# Patient Record
Sex: MALE | Race: BLACK OR AFRICAN AMERICAN | NOT HISPANIC OR LATINO | URBAN - METROPOLITAN AREA
[De-identification: names, ages, dates, MRNs, and addresses within clinical notes are randomized per-mention and may not be internally consistent; named-entity substitution may affect disease eponyms.]

---

## 2017-08-09 ENCOUNTER — OUTPATIENT (OUTPATIENT)
Dept: OUTPATIENT SERVICES | Facility: HOSPITAL | Age: 82
LOS: 1 days | Discharge: HOME | End: 2017-08-09
Payer: MEDICARE

## 2017-08-09 VITALS
TEMPERATURE: 98 F | HEART RATE: 58 BPM | OXYGEN SATURATION: 100 % | DIASTOLIC BLOOD PRESSURE: 83 MMHG | RESPIRATION RATE: 12 BRPM | SYSTOLIC BLOOD PRESSURE: 134 MMHG

## 2017-08-09 DIAGNOSIS — I25.10 ATHEROSCLEROTIC HEART DISEASE OF NATIVE CORONARY ARTERY WITHOUT ANGINA PECTORIS: ICD-10-CM

## 2017-08-09 DIAGNOSIS — Z98.890 OTHER SPECIFIED POSTPROCEDURAL STATES: Chronic | ICD-10-CM

## 2017-08-09 LAB
BUN SERPL-MCNC: 27 MG/DL — HIGH (ref 7–23)
CALCIUM SERPL-MCNC: 9.4 MG/DL — SIGNIFICANT CHANGE UP (ref 8.4–10.5)
CHLORIDE SERPL-SCNC: 106 MMOL/L — SIGNIFICANT CHANGE UP (ref 98–107)
CO2 SERPL-SCNC: 25 MMOL/L — SIGNIFICANT CHANGE UP (ref 22–31)
CREAT SERPL-MCNC: 1.39 MG/DL — HIGH (ref 0.5–1.3)
GLUCOSE SERPL-MCNC: 96 MG/DL — SIGNIFICANT CHANGE UP (ref 70–99)
HBA1C BLD-MCNC: 5.8 % — HIGH (ref 4–5.6)
HCT VFR BLD CALC: 41.2 % — SIGNIFICANT CHANGE UP (ref 39–50)
HGB BLD-MCNC: 12.7 G/DL — LOW (ref 13–17)
MCHC RBC-ENTMCNC: 23 PG — LOW (ref 27–34)
MCHC RBC-ENTMCNC: 30.8 % — LOW (ref 32–36)
MCV RBC AUTO: 74.8 FL — LOW (ref 80–100)
NRBC # FLD: 0 — SIGNIFICANT CHANGE UP
PLATELET # BLD AUTO: 185 K/UL — SIGNIFICANT CHANGE UP (ref 150–400)
PMV BLD: 10.5 FL — SIGNIFICANT CHANGE UP (ref 7–13)
POTASSIUM SERPL-MCNC: 4.4 MMOL/L — SIGNIFICANT CHANGE UP (ref 3.5–5.3)
POTASSIUM SERPL-SCNC: 4.4 MMOL/L — SIGNIFICANT CHANGE UP (ref 3.5–5.3)
RBC # BLD: 5.51 M/UL — SIGNIFICANT CHANGE UP (ref 4.2–5.8)
RBC # FLD: 15.4 % — HIGH (ref 10.3–14.5)
SODIUM SERPL-SCNC: 142 MMOL/L — SIGNIFICANT CHANGE UP (ref 135–145)
WBC # BLD: 3.45 K/UL — LOW (ref 3.8–10.5)
WBC # FLD AUTO: 3.45 K/UL — LOW (ref 3.8–10.5)

## 2017-08-09 PROCEDURE — 93010 ELECTROCARDIOGRAM REPORT: CPT

## 2017-08-09 RX ORDER — DORZOLAMIDE HYDROCHLORIDE 20 MG/ML
1 SOLUTION/ DROPS OPHTHALMIC
Qty: 0 | Refills: 0 | COMMUNITY

## 2017-08-09 RX ORDER — BRIMONIDINE TARTRATE 2 MG/MG
1 SOLUTION/ DROPS OPHTHALMIC
Qty: 0 | Refills: 0 | COMMUNITY

## 2017-08-09 RX ORDER — SODIUM CHLORIDE 9 MG/ML
3 INJECTION INTRAMUSCULAR; INTRAVENOUS; SUBCUTANEOUS EVERY 8 HOURS
Qty: 0 | Refills: 0 | Status: DISCONTINUED | OUTPATIENT
Start: 2017-08-09 | End: 2017-08-24

## 2017-08-09 RX ORDER — ATORVASTATIN CALCIUM 80 MG/1
1 TABLET, FILM COATED ORAL
Qty: 30 | Refills: 0 | OUTPATIENT
Start: 2017-08-09 | End: 2017-09-08

## 2017-08-09 RX ORDER — SODIUM CHLORIDE 9 MG/ML
500 INJECTION INTRAMUSCULAR; INTRAVENOUS; SUBCUTANEOUS
Qty: 0 | Refills: 0 | Status: DISCONTINUED | OUTPATIENT
Start: 2017-08-09 | End: 2017-08-24

## 2017-08-09 NOTE — CHART NOTE - NSCHARTNOTEFT_GEN_A_CORE
The patient is s/p cardiac cath, showed RCA 20%, recommended by Dr. Jackman to start Plavix 75 mg daily and Lipitor 20 mg at bedtime. However, the patient refuses to start Plavix  and would like to discuss it with his primary cardiologist. Also, as per Dr. Jackman, patient can restart Eliqius in am 8/10/17.   Unable to use e-prescribe (sunrise is down), patient was made aware, he will get the prescription from his cardiologist.

## 2017-08-09 NOTE — H&P CARDIOLOGY - PMH
CAD in native artery    Cardiac pacemaker  2010 [employer]  Chronic kidney disease, unspecified CKD stage    Glaucoma    HTN (Hypertension)    Mobitz type 1 second degree AV block  s/p PPM implant  PAF (paroxysmal atrial fibrillation)  on Eliquis  Prostate Cancer  10 years ago - treated with prostatectomy  Rotator Cuff Injury    SSS (sick sinus syndrome)  s/p PPM implant  Stented coronary artery    Stented coronary artery CAD in native artery    Cardiac pacemaker  2010 [employer]  Chronic kidney disease, unspecified CKD stage    Glaucoma    HTN (Hypertension)    Mobitz type 1 second degree AV block  s/p PPM implant  PAF (paroxysmal atrial fibrillation)  on Eliquis  Prostate Cancer  10 years ago - treated with prostatectomy  Rotator Cuff Injury    SSS (sick sinus syndrome)  s/p PPM implant  Stented coronary artery  RCA stent in 2012

## 2017-08-09 NOTE — H&P CARDIOLOGY - REVIEW OF SYSTEMS
NO chest pain,  palpitations, diaphoresis, lightheadedness, dizziness, syncope, increased lower extremity edema, fever chills, malaise, myalgias, anorexia, weight changes ( loss or gain), night sweats, generalized fatigue abdominal pain, N/V/C/D BRBPR, melena, urinary symptoms, cough, and wheezing.

## 2017-08-09 NOTE — CHART NOTE - NSCHARTNOTEFT_GEN_A_CORE
The patient was noted to have elevated HgbA1c. The patient was made aware. Prediabetes pamphlet is given. The patient was recommended to f/u with PMD for further treatment. The patient was noted to have elevated HgbA1c. The patient was made aware over the phone. The patient was recommended to f/u with PMD for further treatment.

## 2017-08-09 NOTE — H&P CARDIOLOGY - HISTORY OF PRESENT ILLNESS
84 y.o. male presents today for elective cardiac catheterization. The patient c/o SOB with exertion (climbing stairs) started 1 year, getting progressively worse. The patient claims that could walk and swim miles, however lately he feels SOB with walking 2 blocks, resolve with rest. Denies chest pain, palpitations, dizziness, presyncope, syncope, fever, rash, b/l lower extremities edema. The patient was evaluated by his cardiologist, had Echo. Last Stress test in 2013. Due to patient's symptoms and abnormal non invasive tests, the patient was recommended to have cardiac cath. The patient denies any complaints at present.     Echo 4/5/17 -EF 55%, Normal LV function and size. Mild diastolic dysfunction. Device wire in R heart. Mild LAE.   Stress test 12/18/13 - EF 59%. Normal myocardial perfusion SPECT images. 84 y.o. male presents today for elective cardiac catheterization. The patient c/o SOB with exertion (climbing stairs) started 1 year ago, getting progressively worse. The patient claims that could walk and swim a few miles, however lately he feels SOB with walking 2 blocks, resolve with rest. Denies chest pain, palpitations, dizziness, presyncope, syncope, fever, rash, b/l lower extremities edema. The patient was evaluated by his cardiologist, had Echo. Last Stress test in 2013. Due to patient's symptoms and abnormal non invasive tests, the patient was recommended to have cardiac cath. The patient denies any complaints at present.     Echo 4/5/17 -EF 55%, Normal LV function and size. Mild diastolic dysfunction. Device wire in R heart. Mild LAE.   Stress test 12/18/13 - EF 59%. Normal myocardial perfusion SPECT images.

## 2017-08-09 NOTE — H&P CARDIOLOGY - PSH
Cardiac pacemaker  11/4/10 for symptomatic bradycardia  H/O eye surgery    S/P Prostatectomy  10 years ago  S/P Rotator Cuff Surgery    S/P T&A    S/P TKR (Total Knee Replacement)  b/l

## 2017-08-24 ENCOUNTER — APPOINTMENT (OUTPATIENT)
Dept: NUCLEAR MEDICINE | Facility: IMAGING CENTER | Age: 82
End: 2017-08-24
Payer: MEDICARE

## 2017-08-24 ENCOUNTER — OUTPATIENT (OUTPATIENT)
Dept: OUTPATIENT SERVICES | Facility: HOSPITAL | Age: 82
LOS: 1 days | End: 2017-08-24
Payer: COMMERCIAL

## 2017-08-24 DIAGNOSIS — R74.8 ABNORMAL LEVELS OF OTHER SERUM ENZYMES: ICD-10-CM

## 2017-08-24 DIAGNOSIS — Z98.890 OTHER SPECIFIED POSTPROCEDURAL STATES: Chronic | ICD-10-CM

## 2017-08-24 PROCEDURE — 78306 BONE IMAGING WHOLE BODY: CPT

## 2017-08-24 PROCEDURE — 78320: CPT | Mod: 26

## 2017-08-24 PROCEDURE — 78306 BONE IMAGING WHOLE BODY: CPT | Mod: 26

## 2017-08-24 PROCEDURE — 78999 UNLISTED MISC PX DX NUC MED: CPT

## 2017-08-24 PROCEDURE — A9561: CPT

## 2017-09-12 ENCOUNTER — OUTPATIENT (OUTPATIENT)
Dept: OUTPATIENT SERVICES | Facility: HOSPITAL | Age: 82
LOS: 1 days | End: 2017-09-12
Payer: COMMERCIAL

## 2017-09-12 ENCOUNTER — APPOINTMENT (OUTPATIENT)
Dept: CT IMAGING | Facility: IMAGING CENTER | Age: 82
End: 2017-09-12
Payer: COMMERCIAL

## 2017-09-12 DIAGNOSIS — C79.51 SECONDARY MALIGNANT NEOPLASM OF BONE: ICD-10-CM

## 2017-09-12 DIAGNOSIS — C61 MALIGNANT NEOPLASM OF PROSTATE: ICD-10-CM

## 2017-09-12 DIAGNOSIS — Z98.890 OTHER SPECIFIED POSTPROCEDURAL STATES: Chronic | ICD-10-CM

## 2017-09-12 PROCEDURE — 76376 3D RENDER W/INTRP POSTPROCES: CPT | Mod: 26

## 2017-09-12 PROCEDURE — 76376 3D RENDER W/INTRP POSTPROCES: CPT

## 2017-09-12 PROCEDURE — 73700 CT LOWER EXTREMITY W/O DYE: CPT | Mod: 26,50

## 2017-09-12 PROCEDURE — 73700 CT LOWER EXTREMITY W/O DYE: CPT

## 2017-10-16 ENCOUNTER — APPOINTMENT (OUTPATIENT)
Dept: ORTHOPEDIC SURGERY | Facility: CLINIC | Age: 82
End: 2017-10-16
Payer: COMMERCIAL

## 2017-10-16 VITALS — WEIGHT: 195 LBS | BODY MASS INDEX: 25.03 KG/M2 | HEIGHT: 74 IN

## 2017-10-16 VITALS — DIASTOLIC BLOOD PRESSURE: 79 MMHG | SYSTOLIC BLOOD PRESSURE: 165 MMHG | HEART RATE: 65 BPM

## 2017-10-16 DIAGNOSIS — Z78.9 OTHER SPECIFIED HEALTH STATUS: ICD-10-CM

## 2017-10-16 DIAGNOSIS — Z85.46 PERSONAL HISTORY OF MALIGNANT NEOPLASM OF PROSTATE: ICD-10-CM

## 2017-10-16 DIAGNOSIS — Z56.0 UNEMPLOYMENT, UNSPECIFIED: ICD-10-CM

## 2017-10-16 DIAGNOSIS — C79.51 MALIGNANT NEOPLASM OF PROSTATE: ICD-10-CM

## 2017-10-16 DIAGNOSIS — C61 MALIGNANT NEOPLASM OF PROSTATE: ICD-10-CM

## 2017-10-16 DIAGNOSIS — Z86.79 PERSONAL HISTORY OF OTHER DISEASES OF THE CIRCULATORY SYSTEM: ICD-10-CM

## 2017-10-16 PROCEDURE — 72170 X-RAY EXAM OF PELVIS: CPT

## 2017-10-16 PROCEDURE — 99204 OFFICE O/P NEW MOD 45 MIN: CPT

## 2017-10-16 SDOH — ECONOMIC STABILITY - INCOME SECURITY: UNEMPLOYMENT, UNSPECIFIED: Z56.0

## 2017-11-14 PROBLEM — Z78.9 DOES NOT USE ILLICIT DRUGS: Status: ACTIVE | Noted: 2017-10-16

## 2017-11-14 PROBLEM — Z85.46 HISTORY OF MALIGNANT NEOPLASM OF PROSTATE: Status: RESOLVED | Noted: 2017-10-16 | Resolved: 2017-11-14

## 2017-11-14 PROBLEM — Z86.79 HISTORY OF HYPERTENSION: Status: RESOLVED | Noted: 2017-10-16 | Resolved: 2017-11-14

## 2017-11-14 PROBLEM — Z78.9 RARELY CONSUMES ALCOHOL: Status: ACTIVE | Noted: 2017-10-16

## 2017-11-14 PROBLEM — Z56.0 UNEMPLOYED: Status: ACTIVE | Noted: 2017-10-16

## 2017-11-14 PROBLEM — Z78.9 CURRENT NON-SMOKER: Status: ACTIVE | Noted: 2017-10-16

## 2017-11-14 PROBLEM — Z78.9 EXERCISES OCCASIONALLY: Status: ACTIVE | Noted: 2017-10-16

## 2017-11-14 RX ORDER — LOSARTAN POTASSIUM 100 MG/1
TABLET, FILM COATED ORAL
Refills: 0 | Status: ACTIVE | COMMUNITY

## 2017-11-14 RX ORDER — METOPROLOL SUCCINATE 200 MG/1
TABLET, EXTENDED RELEASE ORAL
Refills: 0 | Status: ACTIVE | COMMUNITY

## 2018-04-02 ENCOUNTER — APPOINTMENT (OUTPATIENT)
Dept: CT IMAGING | Facility: IMAGING CENTER | Age: 83
End: 2018-04-02
Payer: MEDICARE

## 2018-04-02 ENCOUNTER — OUTPATIENT (OUTPATIENT)
Dept: OUTPATIENT SERVICES | Facility: HOSPITAL | Age: 83
LOS: 1 days | End: 2018-04-02
Payer: COMMERCIAL

## 2018-04-02 DIAGNOSIS — Z00.8 ENCOUNTER FOR OTHER GENERAL EXAMINATION: ICD-10-CM

## 2018-04-02 DIAGNOSIS — Z98.890 OTHER SPECIFIED POSTPROCEDURAL STATES: Chronic | ICD-10-CM

## 2018-04-02 PROCEDURE — 74177 CT ABD & PELVIS W/CONTRAST: CPT

## 2018-04-02 PROCEDURE — 74177 CT ABD & PELVIS W/CONTRAST: CPT | Mod: 26

## 2018-07-16 PROBLEM — Z95.5 PRESENCE OF CORONARY ANGIOPLASTY IMPLANT AND GRAFT: Chronic | Status: ACTIVE | Noted: 2017-08-09

## 2018-07-16 PROBLEM — Z95.0 PRESENCE OF CARDIAC PACEMAKER: Chronic | Status: ACTIVE | Noted: 2017-08-09

## 2019-01-18 PROBLEM — I49.5 SICK SINUS SYNDROME: Chronic | Status: ACTIVE | Noted: 2017-08-09

## 2019-01-18 PROBLEM — I48.0 PAROXYSMAL ATRIAL FIBRILLATION: Chronic | Status: ACTIVE | Noted: 2017-08-09

## 2019-01-18 PROBLEM — N18.9 CHRONIC KIDNEY DISEASE, UNSPECIFIED: Chronic | Status: ACTIVE | Noted: 2017-08-09

## 2019-01-18 PROBLEM — I25.10 ATHEROSCLEROTIC HEART DISEASE OF NATIVE CORONARY ARTERY WITHOUT ANGINA PECTORIS: Chronic | Status: ACTIVE | Noted: 2017-08-09

## 2019-01-18 PROBLEM — I44.1 ATRIOVENTRICULAR BLOCK, SECOND DEGREE: Chronic | Status: ACTIVE | Noted: 2017-08-09

## 2019-02-08 ENCOUNTER — OUTPATIENT (OUTPATIENT)
Dept: OUTPATIENT SERVICES | Facility: HOSPITAL | Age: 84
LOS: 1 days | End: 2019-02-08

## 2019-02-08 ENCOUNTER — OUTPATIENT (OUTPATIENT)
Dept: OUTPATIENT SERVICES | Facility: HOSPITAL | Age: 84
LOS: 1 days | End: 2019-02-08
Payer: COMMERCIAL

## 2019-02-08 ENCOUNTER — APPOINTMENT (OUTPATIENT)
Dept: CV DIAGNOSITCS | Facility: HOSPITAL | Age: 84
End: 2019-02-08

## 2019-02-08 VITALS
HEART RATE: 62 BPM | OXYGEN SATURATION: 98 % | TEMPERATURE: 98 F | RESPIRATION RATE: 16 BRPM | SYSTOLIC BLOOD PRESSURE: 156 MMHG | DIASTOLIC BLOOD PRESSURE: 87 MMHG

## 2019-02-08 DIAGNOSIS — Z98.890 OTHER SPECIFIED POSTPROCEDURAL STATES: Chronic | ICD-10-CM

## 2019-02-08 DIAGNOSIS — I48.91 UNSPECIFIED ATRIAL FIBRILLATION: ICD-10-CM

## 2019-02-08 DIAGNOSIS — Z01.818 ENCOUNTER FOR OTHER PREPROCEDURAL EXAMINATION: ICD-10-CM

## 2019-02-08 LAB
ANION GAP SERPL CALC-SCNC: 12 MMOL/L — SIGNIFICANT CHANGE UP (ref 5–17)
APTT BLD: 28.7 SEC — SIGNIFICANT CHANGE UP (ref 27.5–36.3)
BLD GP AB SCN SERPL QL: NEGATIVE — SIGNIFICANT CHANGE UP
BUN SERPL-MCNC: 20 MG/DL — SIGNIFICANT CHANGE UP (ref 7–23)
CALCIUM SERPL-MCNC: 8.8 MG/DL — SIGNIFICANT CHANGE UP (ref 8.4–10.5)
CHLORIDE SERPL-SCNC: 109 MMOL/L — HIGH (ref 96–108)
CO2 SERPL-SCNC: 22 MMOL/L — SIGNIFICANT CHANGE UP (ref 22–31)
CREAT SERPL-MCNC: 1.43 MG/DL — HIGH (ref 0.5–1.3)
GLUCOSE SERPL-MCNC: 103 MG/DL — HIGH (ref 70–99)
HCT VFR BLD CALC: 39 % — SIGNIFICANT CHANGE UP (ref 39–50)
HGB BLD-MCNC: 12.7 G/DL — LOW (ref 13–17)
INR BLD: 1.09 RATIO — SIGNIFICANT CHANGE UP (ref 0.88–1.16)
MCHC RBC-ENTMCNC: 25.9 PG — LOW (ref 27–34)
MCHC RBC-ENTMCNC: 32.6 GM/DL — SIGNIFICANT CHANGE UP (ref 32–36)
MCV RBC AUTO: 79.6 FL — LOW (ref 80–100)
PLATELET # BLD AUTO: 162 K/UL — SIGNIFICANT CHANGE UP (ref 150–400)
POTASSIUM SERPL-MCNC: 4.8 MMOL/L — SIGNIFICANT CHANGE UP (ref 3.5–5.3)
POTASSIUM SERPL-SCNC: 4.8 MMOL/L — SIGNIFICANT CHANGE UP (ref 3.5–5.3)
PROTHROM AB SERPL-ACNC: 12.6 SEC — SIGNIFICANT CHANGE UP (ref 10–12.9)
RBC # BLD: 4.9 M/UL — SIGNIFICANT CHANGE UP (ref 4.2–5.8)
RBC # FLD: 14 % — SIGNIFICANT CHANGE UP (ref 10.3–14.5)
RH IG SCN BLD-IMP: POSITIVE — SIGNIFICANT CHANGE UP
SODIUM SERPL-SCNC: 143 MMOL/L — SIGNIFICANT CHANGE UP (ref 135–145)
WBC # BLD: 4.8 K/UL — SIGNIFICANT CHANGE UP (ref 3.8–10.5)
WBC # FLD AUTO: 4.8 K/UL — SIGNIFICANT CHANGE UP (ref 3.8–10.5)

## 2019-02-08 PROCEDURE — 85730 THROMBOPLASTIN TIME PARTIAL: CPT

## 2019-02-08 PROCEDURE — 85027 COMPLETE CBC AUTOMATED: CPT

## 2019-02-08 PROCEDURE — 85610 PROTHROMBIN TIME: CPT

## 2019-02-08 PROCEDURE — 80048 BASIC METABOLIC PNL TOTAL CA: CPT

## 2019-02-08 PROCEDURE — 86901 BLOOD TYPING SEROLOGIC RH(D): CPT

## 2019-02-08 PROCEDURE — 86850 RBC ANTIBODY SCREEN: CPT

## 2019-02-08 PROCEDURE — 86900 BLOOD TYPING SEROLOGIC ABO: CPT

## 2019-02-08 PROCEDURE — 93010 ELECTROCARDIOGRAM REPORT: CPT

## 2019-02-08 PROCEDURE — 93005 ELECTROCARDIOGRAM TRACING: CPT

## 2019-02-08 RX ORDER — BIMATOPROST 0.3 MG/ML
1 SOLUTION/ DROPS OPHTHALMIC
Qty: 0 | Refills: 0 | COMMUNITY

## 2019-02-08 NOTE — H&P CARDIOLOGY - HISTORY OF PRESENT ILLNESS
86 y.o. male HTN, HLD, SSS, AV block s/p PPM, CAD s/p stent, CKD, glaucoma, prostate CA, Afib on Eliquis who presents today for PST for Afib ablation scheduled on 2/11.  Pt reports fatigued and short of breath that limits his daily activities, denies dizziness, diaphoresis, palpitations, nausea, vomiting, recent weight gain, or syncope.       Echo 4/5/17 -EF 55%, Normal LV function and size. Mild diastolic dysfunction. Device wire in R heart. Mild LAE.   Stress test 12/18/13 - EF 59%. Normal myocardial perfusion SPECT images.

## 2019-02-08 NOTE — H&P CARDIOLOGY - PMH
CAD in native artery    Cardiac pacemaker  2010 [employer]  Chronic kidney disease, unspecified CKD stage    Glaucoma    HTN (Hypertension)    Mobitz type 1 second degree AV block  s/p PPM implant  PAF (paroxysmal atrial fibrillation)  on Eliquis  Prostate Cancer  10 years ago - treated with prostatectomy  Rotator Cuff Injury    SSS (sick sinus syndrome)  s/p PPM implant  Stented coronary artery  RCA stent in 2012

## 2019-02-11 ENCOUNTER — APPOINTMENT (OUTPATIENT)
Dept: CV DIAGNOSITCS | Facility: HOSPITAL | Age: 84
End: 2019-02-11

## 2019-02-11 ENCOUNTER — INPATIENT (INPATIENT)
Facility: HOSPITAL | Age: 84
LOS: 0 days | Discharge: HOME | DRG: 274 | End: 2019-02-12
Attending: INTERNAL MEDICINE | Admitting: INTERNAL MEDICINE
Payer: COMMERCIAL

## 2019-02-11 ENCOUNTER — TRANSCRIPTION ENCOUNTER (OUTPATIENT)
Age: 84
End: 2019-02-11

## 2019-02-11 VITALS — HEIGHT: 74 IN | WEIGHT: 192.24 LBS

## 2019-02-11 DIAGNOSIS — I48.91 UNSPECIFIED ATRIAL FIBRILLATION: ICD-10-CM

## 2019-02-11 DIAGNOSIS — Z98.890 OTHER SPECIFIED POSTPROCEDURAL STATES: Chronic | ICD-10-CM

## 2019-02-11 LAB — RH IG SCN BLD-IMP: POSITIVE — SIGNIFICANT CHANGE UP

## 2019-02-11 PROCEDURE — 93010 ELECTROCARDIOGRAM REPORT: CPT

## 2019-02-11 PROCEDURE — 99223 1ST HOSP IP/OBS HIGH 75: CPT | Mod: GC

## 2019-02-11 PROCEDURE — 93325 DOPPLER ECHO COLOR FLOW MAPG: CPT | Mod: 26

## 2019-02-11 PROCEDURE — 93312 ECHO TRANSESOPHAGEAL: CPT | Mod: 26

## 2019-02-11 PROCEDURE — 93320 DOPPLER ECHO COMPLETE: CPT | Mod: 26

## 2019-02-11 RX ORDER — FUROSEMIDE 40 MG
20 TABLET ORAL DAILY
Qty: 0 | Refills: 0 | Status: DISCONTINUED | OUTPATIENT
Start: 2019-02-11 | End: 2019-02-11

## 2019-02-11 RX ORDER — METOPROLOL TARTRATE 50 MG
50 TABLET ORAL DAILY
Qty: 0 | Refills: 0 | Status: DISCONTINUED | OUTPATIENT
Start: 2019-02-11 | End: 2019-02-12

## 2019-02-11 RX ORDER — PANTOPRAZOLE SODIUM 20 MG/1
40 TABLET, DELAYED RELEASE ORAL
Qty: 0 | Refills: 0 | Status: DISCONTINUED | OUTPATIENT
Start: 2019-02-11 | End: 2019-02-12

## 2019-02-11 RX ORDER — APIXABAN 2.5 MG/1
2.5 TABLET, FILM COATED ORAL EVERY 12 HOURS
Qty: 0 | Refills: 0 | Status: DISCONTINUED | OUTPATIENT
Start: 2019-02-11 | End: 2019-02-12

## 2019-02-11 RX ADMIN — APIXABAN 2.5 MILLIGRAM(S): 2.5 TABLET, FILM COATED ORAL at 20:44

## 2019-02-11 NOTE — PRE-ANESTHESIA EVALUATION ADULT - NSANTHOSAYNRD_GEN_A_CORE
No. BALTAZAR screening performed.  STOP BANG Legend: 0-2 = LOW Risk; 3-4 = INTERMEDIATE Risk; 5-8 = HIGH Risk

## 2019-02-11 NOTE — CHART NOTE - NSCHARTNOTEFT_GEN_A_CORE
====================  CCU MIDNIGHT ROUNDS  ====================    LORI ALVAREZ  [id number]    ====================  SUMMARY: HPI:    ====================        ====================  NEW EVENTS:  s/p AFib ablation- bilateral groin sutures in place- no complaints.   ====================        ====================  VITALS (Last 12 hrs):  ====================    T(C): 36 (02-11-19 @ 14:20), Max: 36 (02-11-19 @ 14:20)  HR: 60 (02-11-19 @ 19:30) (60 - 62)  BP: 117/76 (02-11-19 @ 19:30) (117/76 - 157/89)  BP(mean): 92 (02-11-19 @ 19:30) (85 - 105)  RR: 18 (02-11-19 @ 19:30) (14 - 18)  SpO2: 99% (02-11-19 @ 19:30) (98% - 100%)          I&O's Summary    11 Feb 2019 07:01  -  11 Feb 2019 21:14  --------------------------------------------------------  IN: 0 mL / OUT: 200 mL / NET: -200 mL        ====================  PLAN:  # afib  - s/p ablation; in NSR  - bilateral groin sutures to be removed tonight; monitor sites  - continue home meds, eliquis tonight, soft diet, PPI  - anticipated discharge home tomorrow if stable   - monitor lytes, I/Os  ====================    HEALTH ISSUES - PROBLEM Dx:        WADE Chen #[id number]

## 2019-02-11 NOTE — PROGRESS NOTE ADULT - SUBJECTIVE AND OBJECTIVE BOX
EP Brief Operative Note    Diagnosis: persistent AF, typical AFL  Procedure: Afib and AFL ablations  Surgeon: Armando Fishman M.D.  Findings: none  EBL: minimal  Specimens: none  Post-op Diagnosis: NSR  Assistants: none      A/P) s/p uncomplicated atrial fibrillation (PVI) and atrial flutter (CTI) ablations, no acute complications    -restart all home meds including eliquis 2.5 bid tonight at 6pm  -start lasix 20mg po daily  -no need for AAD nor carafate  -expect d/c home in AM after limited portable echo r/o effusion  -f/u with me on Thu March 7th at 10:20am  -f/u with Lyandres on Tuesday April 30th at 11:30am      Armando Fishman M.D., [facility]  Cardiac Electrophysiology  [facility]  [address] [place] [address]  [email or web address]    office [phone]  pager [phone]

## 2019-02-12 VITALS — DIASTOLIC BLOOD PRESSURE: 66 MMHG | HEART RATE: 61 BPM | SYSTOLIC BLOOD PRESSURE: 127 MMHG

## 2019-02-12 DIAGNOSIS — I48.0 PAROXYSMAL ATRIAL FIBRILLATION: ICD-10-CM

## 2019-02-12 LAB
ANION GAP SERPL CALC-SCNC: 15 MMOL/L — SIGNIFICANT CHANGE UP (ref 5–17)
BUN SERPL-MCNC: 20 MG/DL — SIGNIFICANT CHANGE UP (ref 7–23)
CALCIUM SERPL-MCNC: 7.2 MG/DL — LOW (ref 8.4–10.5)
CHLORIDE SERPL-SCNC: 110 MMOL/L — HIGH (ref 96–108)
CO2 SERPL-SCNC: 18 MMOL/L — LOW (ref 22–31)
CREAT SERPL-MCNC: 1.41 MG/DL — HIGH (ref 0.5–1.3)
GLUCOSE SERPL-MCNC: 116 MG/DL — HIGH (ref 70–99)
HCT VFR BLD CALC: 32.1 % — LOW (ref 39–50)
HCT VFR BLD CALC: 34.4 % — LOW (ref 39–50)
HGB BLD-MCNC: 10.4 G/DL — LOW (ref 13–17)
HGB BLD-MCNC: 10.9 G/DL — LOW (ref 13–17)
MAGNESIUM SERPL-MCNC: 1.4 MG/DL — LOW (ref 1.6–2.6)
MCHC RBC-ENTMCNC: 25.1 PG — LOW (ref 27–34)
MCHC RBC-ENTMCNC: 25.3 PG — LOW (ref 27–34)
MCHC RBC-ENTMCNC: 31.8 GM/DL — LOW (ref 32–36)
MCHC RBC-ENTMCNC: 32.2 GM/DL — SIGNIFICANT CHANGE UP (ref 32–36)
MCV RBC AUTO: 78.6 FL — LOW (ref 80–100)
MCV RBC AUTO: 78.9 FL — LOW (ref 80–100)
PLATELET # BLD AUTO: 135 K/UL — LOW (ref 150–400)
PLATELET # BLD AUTO: 135 K/UL — LOW (ref 150–400)
POTASSIUM SERPL-MCNC: 4.3 MMOL/L — SIGNIFICANT CHANGE UP (ref 3.5–5.3)
POTASSIUM SERPL-SCNC: 4.3 MMOL/L — SIGNIFICANT CHANGE UP (ref 3.5–5.3)
RBC # BLD: 4.09 M/UL — LOW (ref 4.2–5.8)
RBC # BLD: 4.36 M/UL — SIGNIFICANT CHANGE UP (ref 4.2–5.8)
RBC # FLD: 13.4 % — SIGNIFICANT CHANGE UP (ref 10.3–14.5)
RBC # FLD: 13.8 % — SIGNIFICANT CHANGE UP (ref 10.3–14.5)
SODIUM SERPL-SCNC: 143 MMOL/L — SIGNIFICANT CHANGE UP (ref 135–145)
WBC # BLD: 5.4 K/UL — SIGNIFICANT CHANGE UP (ref 3.8–10.5)
WBC # BLD: 6.7 K/UL — SIGNIFICANT CHANGE UP (ref 3.8–10.5)
WBC # FLD AUTO: 5.4 K/UL — SIGNIFICANT CHANGE UP (ref 3.8–10.5)
WBC # FLD AUTO: 6.7 K/UL — SIGNIFICANT CHANGE UP (ref 3.8–10.5)

## 2019-02-12 PROCEDURE — 80048 BASIC METABOLIC PNL TOTAL CA: CPT

## 2019-02-12 PROCEDURE — C1732: CPT

## 2019-02-12 PROCEDURE — 93321 DOPPLER ECHO F-UP/LMTD STD: CPT

## 2019-02-12 PROCEDURE — 93321 DOPPLER ECHO F-UP/LMTD STD: CPT | Mod: 26

## 2019-02-12 PROCEDURE — 86901 BLOOD TYPING SEROLOGIC RH(D): CPT

## 2019-02-12 PROCEDURE — 93308 TTE F-UP OR LMTD: CPT

## 2019-02-12 PROCEDURE — C1894: CPT

## 2019-02-12 PROCEDURE — 99233 SBSQ HOSP IP/OBS HIGH 50: CPT

## 2019-02-12 PROCEDURE — 93662 INTRACARDIAC ECG (ICE): CPT

## 2019-02-12 PROCEDURE — 93325 DOPPLER ECHO COLOR FLOW MAPG: CPT

## 2019-02-12 PROCEDURE — 93010 ELECTROCARDIOGRAM REPORT: CPT

## 2019-02-12 PROCEDURE — C1766: CPT

## 2019-02-12 PROCEDURE — 83735 ASSAY OF MAGNESIUM: CPT

## 2019-02-12 PROCEDURE — 93656 COMPRE EP EVAL ABLTJ ATR FIB: CPT

## 2019-02-12 PROCEDURE — 86850 RBC ANTIBODY SCREEN: CPT

## 2019-02-12 PROCEDURE — 93655 ICAR CATH ABLTJ DSCRT ARRHYT: CPT

## 2019-02-12 PROCEDURE — 93623 PRGRMD STIMJ&PACG IV RX NFS: CPT

## 2019-02-12 PROCEDURE — 85027 COMPLETE CBC AUTOMATED: CPT

## 2019-02-12 PROCEDURE — 93312 ECHO TRANSESOPHAGEAL: CPT

## 2019-02-12 PROCEDURE — 93308 TTE F-UP OR LMTD: CPT | Mod: 26

## 2019-02-12 PROCEDURE — 93320 DOPPLER ECHO COMPLETE: CPT

## 2019-02-12 PROCEDURE — 93005 ELECTROCARDIOGRAM TRACING: CPT

## 2019-02-12 PROCEDURE — 93613 INTRACARDIAC EPHYS 3D MAPG: CPT

## 2019-02-12 PROCEDURE — C1759: CPT

## 2019-02-12 PROCEDURE — 86900 BLOOD TYPING SEROLOGIC ABO: CPT

## 2019-02-12 PROCEDURE — C1731: CPT

## 2019-02-12 RX ORDER — HYDROCHLOROTHIAZIDE 25 MG
25 TABLET ORAL DAILY
Qty: 0 | Refills: 0 | Status: DISCONTINUED | OUTPATIENT
Start: 2019-02-12 | End: 2019-02-12

## 2019-02-12 RX ORDER — FUROSEMIDE 40 MG
1 TABLET ORAL
Qty: 30 | Refills: 2
Start: 2019-02-12 | End: 2019-05-12

## 2019-02-12 RX ORDER — PANTOPRAZOLE SODIUM 20 MG/1
40 TABLET, DELAYED RELEASE ORAL
Qty: 0 | Refills: 0 | Status: DISCONTINUED | OUTPATIENT
Start: 2019-02-12 | End: 2019-02-12

## 2019-02-12 RX ORDER — MAGNESIUM SULFATE 500 MG/ML
2 VIAL (ML) INJECTION ONCE
Qty: 0 | Refills: 0 | Status: COMPLETED | OUTPATIENT
Start: 2019-02-12 | End: 2019-02-12

## 2019-02-12 RX ORDER — PANTOPRAZOLE SODIUM 20 MG/1
1 TABLET, DELAYED RELEASE ORAL
Qty: 30 | Refills: 0
Start: 2019-02-12 | End: 2019-03-13

## 2019-02-12 RX ORDER — FUROSEMIDE 40 MG
20 TABLET ORAL DAILY
Qty: 0 | Refills: 0 | Status: DISCONTINUED | OUTPATIENT
Start: 2019-02-12 | End: 2019-02-12

## 2019-02-12 RX ORDER — LOSARTAN POTASSIUM 100 MG/1
100 TABLET, FILM COATED ORAL DAILY
Qty: 0 | Refills: 0 | Status: DISCONTINUED | OUTPATIENT
Start: 2019-02-12 | End: 2019-02-12

## 2019-02-12 RX ADMIN — PANTOPRAZOLE SODIUM 40 MILLIGRAM(S): 20 TABLET, DELAYED RELEASE ORAL at 06:14

## 2019-02-12 RX ADMIN — LOSARTAN POTASSIUM 100 MILLIGRAM(S): 100 TABLET, FILM COATED ORAL at 06:14

## 2019-02-12 RX ADMIN — Medication 20 MILLIGRAM(S): at 08:23

## 2019-02-12 RX ADMIN — Medication 50 GRAM(S): at 05:10

## 2019-02-12 RX ADMIN — APIXABAN 2.5 MILLIGRAM(S): 2.5 TABLET, FILM COATED ORAL at 05:11

## 2019-02-12 RX ADMIN — Medication 50 MILLIGRAM(S): at 05:11

## 2019-02-12 RX ADMIN — Medication 25 MILLIGRAM(S): at 06:14

## 2019-02-12 NOTE — PROGRESS NOTE ADULT - SUBJECTIVE AND OBJECTIVE BOX
EP ATTENDING    tele: AV sequential pacing    no palpitations, no syncope, no angina    apixaban 2.5 milliGRAM(s) Oral every 12 hours  furosemide    Tablet 20 milliGRAM(s) Oral daily  hydrochlorothiazide 25 milliGRAM(s) Oral daily  losartan 100 milliGRAM(s) Oral daily  metoprolol succinate ER 50 milliGRAM(s) Oral daily  pantoprazole    Tablet 40 milliGRAM(s) Oral before breakfast                            10.4   5.4   )-----------( 135      ( 12 Feb 2019 01:57 )             32.1       02-12    143  |  110<H>  |  20  ----------------------------<  116<H>  4.3   |  18<L>  |  1.41<H>    Ca    7.2<L>      12 Feb 2019 01:57  Mg     1.4     02-12        T(C): 36.8 (02-12-19 @ 07:00), Max: 37.6 (02-11-19 @ 21:00)  HR: 61 (02-12-19 @ 07:00) (60 - 75)  BP: 109/81 (02-12-19 @ 07:00) (104/87 - 157/89)  RR: 18 (02-12-19 @ 07:00) (14 - 24)  SpO2: 98% (02-12-19 @ 07:00) (94% - 100%)  Wt(kg): --    no JVD  RRR, no murmurs  CTAB  soft nt/nd  no c/c/e    limited portable echo pending    A/P) s/p uncomplicated atrial fibrillation (PVI) and atrial flutter (CTI) ablations, no acute complications    -continue all home meds including eliquis 2.5 bid   -start lasix 20mg po daily  -protonix 40mg daily for 1 month  -no need for AAD nor carafate  -expect d/c home today after limited portable echo r/o effusion  -f/u with me on Thu March 7th at 10:20am  -f/u with Clem on Tuesday April 30th at 11:30am      Armando Fishman M.D., [facility]  Cardiac Electrophysiology  [facility]  [address] [place] [address], [address]  [place] [address]  [email or web address]    office [phone]  pager [phone]

## 2019-02-12 NOTE — DISCHARGE NOTE ADULT - CARE PLAN
Principal Discharge DX:	PAF (paroxysmal atrial fibrillation)  Goal:	Your heart rate and rhythm will be controlled.  Assessment and plan of treatment:	Atrial fibrillation is the most common heart rhythm problem.  The condition puts you at risk for has stroke and heart attack. It helps if you control your blood pressure, not drink more than 1-2 alcohol drinks per day, cut down on caffeine, getting treatment for over active thyroid gland, and get regular exercise.  Call your doctor if you feel your heart racing or beating unusually, chest tightness or pain, lightheaded, faint, shortness of breath especially with exercise. It is important to take your heart medication as prescribed. You may be on anticoagulation which is very important to take as directed - you may need blood work to monitor drug levels.

## 2019-02-12 NOTE — DISCHARGE NOTE ADULT - HOSPITAL COURSE
85 yo M with PMH HTN, HLD, SSS, AV block s/p PPM, CAD s/p stent, CKD, glaucoma, prostate CA, Afib on Eliquis who presents for scheduled Afib ablation. Bilateral groin sutures were removed without complication. Patient tolerated procedure well and eliquis restarted. Patient monitored in CCU2 overnight. 87 yo M with PMH HTN, HLD, SSS, AV block s/p PPM, CAD s/p stent, CKD, glaucoma, prostate CA, Afib on Eliquis who presents for scheduled Afib ablation. Bilateral groin sutures were removed without complication. Patient tolerated procedure well and eliquis restarted. Patient monitored in CCU2 overnight. TTE shows no pericardial effusion.

## 2019-02-12 NOTE — PROGRESS NOTE ADULT - ASSESSMENT
86 y.o. male HTN, HLD, SSS, AV block s/p PPM, CAD s/p stent, CKD, glaucoma, prostate CA, Afib on Eliquis who presents today for PST for Afib ablation scheduled on 2/11.  Pt reports fatigued and short of breath that limits his daily activities, denies dizziness, diaphoresis, palpitations, nausea, vomiting, recent weight gain, or syncope.   Patient s/p Afib Ablation.

## 2019-02-12 NOTE — DISCHARGE NOTE ADULT - ADDITIONAL INSTRUCTIONS
Follow up with Dr. Fishman on Thu March 7th at 10:20am and follow up with Dr. Nj on Tuesday April 30th at 11:30am.  It is important to continue your normal daily activities and to continue to walk as much as possible; with periods of rest when necessary.  Do not perform any strenuous activity or heavy lifting until cleared by Dr. Fishman. Your heart muscle is currently recovering and you should not be exerting it.  In addition there is no bathing in a bathtub, swimming, or submerging you in water until cleared by Dr. Fishman. You have incisions that need to heal and bathing/swimming can expose it to bacteria.  No creams to your incisions. You may remove your dressings when you get home. You may shower. Allow soap and water to run over your incision and pat area dry. Ensure that your groin incisions remain dry at all times to prevent risk of infection.  Follow up with Dr. Fishman in 2-3 weeks. Make an appointment within two weeks. Follow up with your primary cardiologist as well.   You are taking eliquis which is a blood thinner and therefore you are at higher risk of bleeding. If you experience any signs of atrial fibrillation such as dizziness, fatigue, palpitations, or fainting please inform Dr. Fishman as soon as possible or go to your nearest emergency room.  If you experience any signs of bleeding such as bleeding gums, bloody sputum, bleeding in your stool or black stool inform your primary care doctor.

## 2019-02-12 NOTE — PROGRESS NOTE ADULT - PROBLEM SELECTOR PLAN 1
s/p Afib Ablation  apixaban 2.5 milliGRAM(s) Oral every 12 hours  furosemide    Tablet 20 milliGRAM(s) Oral daily  hydrochlorothiazide 25 milliGRAM(s) Oral daily  losartan 100 milliGRAM(s) Oral daily  metoprolol succinate ER 50 milliGRAM(s) Oral daily  pantoprazole    Tablet 40 milliGRAM(s) Oral before breakfast  Discharge to home

## 2019-02-12 NOTE — DISCHARGE NOTE ADULT - CARE PROVIDER_API CALL
Armando Fishman)  Cardiac Electrophysiology; Cardiovascular Disease; Nuclear Cardiology  [address] [facility], [address]  [place] [address]  Phone: [phone]  Fax: [phone]  Follow Up Time:     Jacki Reza)  Cardiovascular Disease; Internal Medicine  [address] [facility], [address]  [place] [address]  Phone: [phone]  Fax: [phone]  Follow Up Time:

## 2019-02-12 NOTE — CONSULT NOTE ADULT - SUBJECTIVE AND OBJECTIVE BOX
HISTORY OF PRESENT ILLNESS: HPI:     85 year old man with past medical history of symptomatic  sick sinus syndrome, paroxysmal atrial fibrillation, high degree of AV block, [employer]  dual chamber pacemaker implanted in 2010. who is now s/p Atiral flutter and fib ablation given exertional dyspnea.  He denies CP, LOC or orthopnea    PAST MEDICAL & SURGICAL HISTORY:  Stented coronary artery: RCA stent in 2012  Chronic kidney disease, unspecified CKD stage  CAD in native artery  Mobitz type 1 second degree AV block: s/p PPM implant  SSS (sick sinus syndrome): s/p PPM implant  PAF (paroxysmal atrial fibrillation): on Eliquis  Cardiac pacemaker: 2010 [employer]  Glaucoma  Rotator Cuff Injury  Prostate Cancer: 10 years ago - treated with prostatectomy  HTN (Hypertension)  H/O eye surgery  Cardiac pacemaker: 11/4/10 for symptomatic bradycardia  S/P T&A  S/P Rotator Cuff Surgery  S/P Prostatectomy: 10 years ago  S/P TKR (Total Knee Replacement): b/l          MEDICATIONS:  MEDICATIONS  (STANDING):  apixaban 2.5 milliGRAM(s) Oral every 12 hours  furosemide    Tablet 20 milliGRAM(s) Oral daily  hydrochlorothiazide 25 milliGRAM(s) Oral daily  losartan 100 milliGRAM(s) Oral daily  metoprolol succinate ER 50 milliGRAM(s) Oral daily  pantoprazole    Tablet 40 milliGRAM(s) Oral before breakfast      Allergies    No Known Allergies    Intolerances        FAMILY HISTORY:  No pertinent family history in first degree relatives    Non-contributary for premature coronary disease or sudden cardiac death    SOCIAL HISTORY:    [ ] Non-smoker  [ ] Smoker  [ ] Alcohol      REVIEW OF SYSTEMS:  [ ]chest pain  [  ]shortness of breath  [  ]palpitations  [  ]syncope  [ ]near syncope [ ]upper extremity weakness   [ ] lower extremity weakness  [  ]diplopia  [  ]altered mental status   [  ]fevers  [ ]chills [ ]nausea  [ ]vomitting  [  ]dysphagia    [ ]abdominal pain  [ ]melena  [ ]BRBPR    [  ]epistaxis  [  ]rash    [ ]lower extremity edema        [ X] All others negative	  [ ] Unable to obtain      LABS:	 	    CARDIAC MARKERS:                              10.9   6.7   )-----------( 135      ( 12 Feb 2019 07:40 )             34.4     Hb Trend: 10.9<--, 10.4<--    02-12    143  |  110<H>  |  20  ----------------------------<  116<H>  4.3   |  18<L>  |  1.41<H>    Ca    7.2<L>      12 Feb 2019 01:57  Mg     1.4     02-12      Creatinine Trend: 1.41<--, 1.43<--        PHYSICAL EXAM:  T(C): 37.1 (02-12-19 @ 10:00), Max: 37.6 (02-11-19 @ 21:00)  HR: 60 (02-12-19 @ 13:00) (60 - 75)  BP: 117/61 (02-12-19 @ 13:00) (104/87 - 157/89)  RR: 14 (02-12-19 @ 12:00) (14 - 24)  SpO2: 100% (02-12-19 @ 12:00) (94% - 100%)  Wt(kg): --  I&O's Summary    11 Feb 2019 07:01  -  12 Feb 2019 07:00  --------------------------------------------------------  IN: 450 mL / OUT: 1000 mL / NET: -550 mL    12 Feb 2019 07:01  -  12 Feb 2019 13:42  --------------------------------------------------------  IN: 0 mL / OUT: 250 mL / NET: -250 mL        Gen: Appears well in NAD  HEENT:  (-)icterus (-)pallor  CV: N S1 S2 1/6 CHRIST (+)2 Pulses B/l  Resp:  Clear to ausculatation B/L, normal effort  GI: (+) BS Soft, NT, ND  Lymph:  (-)Edema, (-)obvious lymphadenopathy  Skin: Warm to touch, Normal turgor  Psych: Appropriate mood and affect        TELEMETRY: 	  demand paced    ECG:  	on admit Aflutter V paced        ASSESSMENT/PLAN: 	 85 year old man with past medical history of symptomatic  sick sinus syndrome, paroxysmal atrial fibrillation, high degree of AV block, [employer]  dual chamber pacemaker implanted in 2010. who is now s/p Atiral flutter and fib ablation    - No clinical CHF  - TOlertaed procedure  - cont eliquyis  - D/C plan per EP    Jimi Vickers MD, [facility]  BEEPER [phone]

## 2019-02-12 NOTE — PROGRESS NOTE ADULT - SUBJECTIVE AND OBJECTIVE BOX
CHIEF COMPLAINT::    INTERVAL HISTORY:    REVIEW OF SYSTEMS: all others negative    MEDICATIONS  (STANDING):  apixaban 2.5 milliGRAM(s) Oral every 12 hours  hydrochlorothiazide 25 milliGRAM(s) Oral daily  losartan 100 milliGRAM(s) Oral daily  metoprolol succinate ER 50 milliGRAM(s) Oral daily  pantoprazole    Tablet 40 milliGRAM(s) Oral before breakfast    MEDICATIONS  (PRN):      Objective:  Vital Signs Last 24 Hrs  T(C): 36.7 (2019 04:00), Max: 37.6 (2019 21:00)  T(F): 98 (2019 04:00), Max: 99.7 (2019 21:00)  HR: 61 (2019 06:00) (60 - 75)  BP: 138/72 (2019 06:00) (104/87 - 157/89)  BP(mean): 90 (2019 06:00) (82 - 105)  RR: 14 (2019 04:00) (14 - 24)  SpO2: 98% (2019 06:00) (94% - 100%)  ICU Vital Signs Last 24 Hrs  T(C): 36.7 (2019 04:00), Max: 37.6 (2019 21:00)  T(F): 98 (2019 04:00), Max: 99.7 (2019 21:00)  HR: 61 (2019 06:00) (60 - 75)  BP: 138/72 (2019 06:00) (104/87 - 157/89)  BP(mean): 90 (2019 06:00) (82 - 105)  ABP: --  ABP(mean): --  RR: 14 (2019 04:00) (14 - 24)  SpO2: 98% (2019 06:00) (94% - 100%)      Adult Advanced Hemodynamics Last 24 Hrs  CVP(mm Hg): --  CVP(cm H2O): --  CO: --  CI: --  PA: --  PA(mean): --  PCWP: --  SVR: --  SVRI: --  PVR: --  PVRI: --       @ 07:01  -  02-12 @ 07:00  --------------------------------------------------------  IN: 450 mL / OUT: 1000 mL / NET: -550 mL      Daily Height in cm: 187.96 (2019 21:00)    Daily Weight in k.6 (2019 21:00)    PHYSICAL EXAM:      Constitutional:    Eyes:    ENMT:    Neck:    Breasts:    Back:    Respiratory:    Cardiovascular:    Gastrointestinal:    Genitourinary:    Rectal:    Extremities:    Vascular:    Neurological:    Skin:    Lymph Nodes:    Musculoskeletal:    Psychiatric:        TELEMETRY:     EKG:     CARDIAC CATH:     ECHO:    IMAGING:                           10.4   5.4   )-----------( 135      ( 2019 01:57 )             32.1         143  |  110<H>  |  20  ----------------------------<  116<H>  4.3   |  18<L>  |  1.41<H>    Ca    7.2<L>      2019 01:57  Mg     1.4                   *BLOOD GAS/ARTERIAL/MIXED/VENOUS  *LACTATE      HEALTH ISSUES - PROBLEM Dx:        HEALTH ISSUES - R/O PROBLEM Dx:      WADE Woods NP   # CHIEF COMPLAINT: Atrial Fibrillation s/p Afib Ablation    INTERVAL HISTORY:  86 y.o. male HTN, HLD, SSS, AV block s/p PPM, CAD s/p stent, CKD, glaucoma, prostate CA, Afib on Eliquis who presents today for PST for Afib ablation scheduled on .  Pt reports fatigued and short of breath that limits his daily activities, denies dizziness, diaphoresis, palpitations, nausea, vomiting, recent weight gain, or syncope.   Patient s/p Afib Ablation.    REVIEW OF SYSTEMS: Denies CP, SOB, all others negative    MEDICATIONS  (STANDING):  apixaban 2.5 milliGRAM(s) Oral every 12 hours  hydrochlorothiazide 25 milliGRAM(s) Oral daily  losartan 100 milliGRAM(s) Oral daily  metoprolol succinate ER 50 milliGRAM(s) Oral daily  pantoprazole    Tablet 40 milliGRAM(s) Oral before breakfast    MEDICATIONS  (PRN):      Objective:  Vital Signs Last 24 Hrs  T(C): 36.7 (2019 04:00), Max: 37.6 (2019 21:00)  T(F): 98 (2019 04:00), Max: 99.7 (2019 21:00)  HR: 61 (2019 06:00) (60 - 75)  BP: 138/72 (2019 06:00) (104/87 - 157/89)  BP(mean): 90 (2019 06:00) (82 - 105)  RR: 14 (2019 04:00) (14 - 24)  SpO2: 98% (2019 06:00) (94% - 100%)  ICU Vital Signs Last 24 Hrs  T(C): 36.7 (2019 04:00), Max: 37.6 (2019 21:00)  T(F): 98 (2019 04:00), Max: 99.7 (2019 21:00)  HR: 61 (2019 06:00) (60 - 75)  BP: 138/72 (2019 06:00) (104/87 - 157/89)  BP(mean): 90 (2019 06:00) (82 - 105)  ABP: --  ABP(mean): --  RR: 14 (2019 04:00) (14 - 24)  SpO2: 98% (2019 06:00) (94% - 100%)      Adult Advanced Hemodynamics Last 24 Hrs  CVP(mm Hg): --  CVP(cm H2O): --  CO: --  CI: --  PA: --  PA(mean): --  PCWP: --  SVR: --  SVRI: --  PVR: --  PVRI: --       @ 07:01  -  02-12 @ 07:00  --------------------------------------------------------  IN: 450 mL / OUT: 1000 mL / NET: -550 mL      Daily Height in cm: 187.96 (2019 21:00)    Daily Weight in k.6 (2019 21:00)    PHYSICAL EXAM:      Constitutional: No acute distress    Eyes: PERRL, EOMI    ENMT: Nml oral mucosa    Respiratory: CTA Bilaterally    Cardiovascular: S1S2 RRR    Gastrointestinal: +BS    Extremities: No pedal edema    Vascular: +1 pedal pulses    Neurological: A+OX3        TELEMETRY: SR    EKG:     CARDIAC CATH:     ECHO:    IMAGING:                           10.4   5.4   )-----------( 135      ( 2019 01:57 )             32.1     02-12    143  |  110<H>  |  20  ----------------------------<  116<H>  4.3   |  18<L>  |  1.41<H>    Ca    7.2<L>      2019 01:57  Mg     1.4     02-12              *BLOOD GAS/ARTERIAL/MIXED/VENOUS  *LACTATE      HEALTH ISSUES - PROBLEM Dx:        HEALTH ISSUES - R/O PROBLEM Dx:      WADE Woods NP   # CHIEF COMPLAINT: Atrial Fibrillation s/p Afib Ablation    INTERVAL HISTORY:  86 y.o. male HTN, HLD, SSS, AV block s/p PPM, CAD s/p stent, CKD, glaucoma, prostate CA, Afib on Eliquis who presents today for PST for Afib ablation scheduled on .  Pt reports fatigued and short of breath that limits his daily activities, denies dizziness, diaphoresis, palpitations, nausea, vomiting, recent weight gain, or syncope.   Patient s/p Afib Ablation.    REVIEW OF SYSTEMS: Denies CP, SOB, all others negative    MEDICATIONS  (STANDING):  apixaban 2.5 milliGRAM(s) Oral every 12 hours  hydrochlorothiazide 25 milliGRAM(s) Oral daily  losartan 100 milliGRAM(s) Oral daily  metoprolol succinate ER 50 milliGRAM(s) Oral daily  pantoprazole    Tablet 40 milliGRAM(s) Oral before breakfast    MEDICATIONS  (PRN):      Objective:  Vital Signs Last 24 Hrs  T(C): 36.7 (2019 04:00), Max: 37.6 (2019 21:00)  T(F): 98 (2019 04:00), Max: 99.7 (2019 21:00)  HR: 61 (2019 06:00) (60 - 75)  BP: 138/72 (2019 06:00) (104/87 - 157/89)  BP(mean): 90 (2019 06:00) (82 - 105)  RR: 14 (2019 04:00) (14 - 24)  SpO2: 98% (2019 06:00) (94% - 100%)  ICU Vital Signs Last 24 Hrs  T(C): 36.7 (2019 04:00), Max: 37.6 (2019 21:00)  T(F): 98 (2019 04:00), Max: 99.7 (2019 21:00)  HR: 61 (2019 06:00) (60 - 75)  BP: 138/72 (2019 06:00) (104/87 - 157/89)  BP(mean): 90 (2019 06:00) (82 - 105)  ABP: --  ABP(mean): --  RR: 14 (2019 04:00) (14 - 24)  SpO2: 98% (2019 06:00) (94% - 100%)      Adult Advanced Hemodynamics Last 24 Hrs  CVP(mm Hg): --  CVP(cm H2O): --  CO: --  CI: --  PA: --  PA(mean): --  PCWP: --  SVR: --  SVRI: --  PVR: --  PVRI: --       @ 07:01  -  02-12 @ 07:00  --------------------------------------------------------  IN: 450 mL / OUT: 1000 mL / NET: -550 mL      Daily Height in cm: 187.96 (2019 21:00)    Daily Weight in k.6 (2019 21:00)    PHYSICAL EXAM:      Constitutional: No acute distress    Eyes: PERRL, EOMI    ENMT: Nml oral mucosa    Respiratory: CTA Bilaterally    Cardiovascular: S1S2 RRR    Gastrointestinal: +BS    Extremities: No pedal edema    Vascular: +1 pedal pulses    Neurological: A+OX3        TELEMETRY: SR    EKG:     CARDIAC CATH:     ECHO:    IMAGING:                           10.4   5.4   )-----------( 135      ( 2019 01:57 )             32.1     02-12    143  |  110<H>  |  20  ----------------------------<  116<H>  4.3   |  18<L>  |  1.41<H>    Ca    7.2<L>      2019 01:57  Mg     1.4     02-12              *BLOOD GAS/ARTERIAL/MIXED/VENOUS  *LACTATE      HEALTH ISSUES - PROBLEM Dx:        HEALTH ISSUES - R/O PROBLEM Dx:      WADE Woods NP   #[id number]

## 2019-02-12 NOTE — DISCHARGE NOTE ADULT - INSTRUCTIONS
You should continue to maintain a heart healthy diet ( low cholesterol, low sodium, and low in saturated fats).You should also continue to eat a soft diet. A soft diet means food that is pureed in consistency, like mash potatoes, yogurts, soft cooked vegetables, soups. You should maintain this diet for one month. This will prevent any irritation to your esophagus which may be weakened due to the procedure. You also should take the protonix pill as prescribed, for one month to prevent any acid from your stomach. This will prevent further irritation by reducing the acid content in your stomach.

## 2019-02-12 NOTE — DISCHARGE NOTE ADULT - PLAN OF CARE
Your heart rate and rhythm will be controlled. Atrial fibrillation is the most common heart rhythm problem.  The condition puts you at risk for has stroke and heart attack. It helps if you control your blood pressure, not drink more than 1-2 alcohol drinks per day, cut down on caffeine, getting treatment for over active thyroid gland, and get regular exercise.  Call your doctor if you feel your heart racing or beating unusually, chest tightness or pain, lightheaded, faint, shortness of breath especially with exercise. It is important to take your heart medication as prescribed. You may be on anticoagulation which is very important to take as directed - you may need blood work to monitor drug levels.

## 2019-02-12 NOTE — DISCHARGE NOTE ADULT - PATIENT PORTAL LINK FT
You can access the [facility] Patient Portal, offered by [facility], by registering with the following website: [email or web address]

## 2019-02-12 NOTE — DISCHARGE NOTE ADULT - MEDICATION SUMMARY - MEDICATIONS TO TAKE
I will START or STAY ON the medications listed below when I get home from the hospital:    prednisoLONE 5 mg oral tablet  -- 1 tab(s) by mouth once a day  -- Indication: For Prostate Ca    sildenafil 100 mg oral tablet  -- 1 tab(s) by mouth once a day, As Needed  -- Indication: For Erectile Dysfunction    Eliquis 2.5 mg oral tablet  -- 1 tab(s) by mouth 2 times a day  Restart Eliquis in AM on 8/10/17  -- Indication: For Atrial fibrillation    Hyzaar 100 mg-25 mg oral tablet  -- 1 tab(s) by mouth once a day  -- Indication: For Hypertension    leuprolide 22.5 mg/3 months intramuscular injection, extended release  -- 1 dose(s) intramuscular once a day  -- Indication: For Prostate CA    metoprolol succinate 50 mg oral tablet, extended release  -- 1 tab(s) by mouth once a day  -- Indication: For Atrial fibrillation    denosumab 120 mg/1.7 mL subcutaneous solution  -- 1 dose(s) subcutaneous once a month  -- Indication: For Osteoporosis    furosemide 20 mg oral tablet  -- 1 tab(s) by mouth once a day  -- Indication: For Atrial fibrillation    abiraterone 250 mg oral tablet  -- 4 tab(s) by mouth once a day  -- Indication: For Prostate Ca    bimatoprost 0.01% ophthalmic solution  -- 1 drop(s) to each affected eye once a day (in the evening)  -- Indication: For Glaucoma    brimonidine 0.2% ophthalmic solution  -- 1 drop(s) to each affected eye 2 times a day  -- Indication: For Glaucoma    dorzolamide 2% ophthalmic solution  -- 1 drop(s) to each affected eye 2 times a day  -- Indication: For Galucoma    Lumigan 0.01% ophthalmic solution  -- 1 drop(s) to each affected eye once a day (in the evening)  -- Indication: For Glaucoma    pantoprazole 40 mg oral delayed release tablet  -- 1 tab(s) by mouth once a day (before a meal)  -- Indication: For Atrial fibrillation

## 2019-12-05 NOTE — PRE-ANESTHESIA EVALUATION ADULT - HEIGHT IN FEET
Mello Verma)  Dietitian nutritionist; Surgery; Surgical Critical Care  [address], [address] [address]  Phone: [phone]  Fax: [phone]  Follow Up Time:
6

## 2020-05-06 ENCOUNTER — APPOINTMENT (OUTPATIENT)
Dept: UROLOGY | Facility: CLINIC | Age: 85
End: 2020-05-06

## 2020-07-20 DIAGNOSIS — Z01.818 ENCOUNTER FOR OTHER PREPROCEDURAL EXAMINATION: ICD-10-CM

## 2020-07-24 ENCOUNTER — APPOINTMENT (OUTPATIENT)
Dept: DISASTER EMERGENCY | Facility: CLINIC | Age: 85
End: 2020-07-24

## 2020-07-25 LAB — SARS-COV-2 N GENE NPH QL NAA+PROBE: NOT DETECTED

## 2020-07-27 ENCOUNTER — OUTPATIENT (OUTPATIENT)
Dept: OUTPATIENT SERVICES | Facility: HOSPITAL | Age: 85
LOS: 1 days | End: 2020-07-27
Payer: COMMERCIAL

## 2020-07-27 DIAGNOSIS — Z98.890 OTHER SPECIFIED POSTPROCEDURAL STATES: Chronic | ICD-10-CM

## 2020-07-27 DIAGNOSIS — I45.9 CONDUCTION DISORDER, UNSPECIFIED: ICD-10-CM

## 2020-07-27 LAB
ALBUMIN SERPL ELPH-MCNC: 3.8 G/DL — SIGNIFICANT CHANGE UP (ref 3.3–5)
ALP SERPL-CCNC: 78 U/L — SIGNIFICANT CHANGE UP (ref 40–120)
ALT FLD-CCNC: 8 U/L — LOW (ref 10–45)
ANION GAP SERPL CALC-SCNC: 13 MMOL/L — SIGNIFICANT CHANGE UP (ref 5–17)
APTT BLD: 29.5 SEC — SIGNIFICANT CHANGE UP (ref 27.5–35.5)
AST SERPL-CCNC: 19 U/L — SIGNIFICANT CHANGE UP (ref 10–40)
BILIRUB SERPL-MCNC: 0.4 MG/DL — SIGNIFICANT CHANGE UP (ref 0.2–1.2)
BUN SERPL-MCNC: 13 MG/DL — SIGNIFICANT CHANGE UP (ref 7–23)
CALCIUM SERPL-MCNC: 9 MG/DL — SIGNIFICANT CHANGE UP (ref 8.4–10.5)
CHLORIDE SERPL-SCNC: 107 MMOL/L — SIGNIFICANT CHANGE UP (ref 96–108)
CO2 SERPL-SCNC: 22 MMOL/L — SIGNIFICANT CHANGE UP (ref 22–31)
CREAT SERPL-MCNC: 1.09 MG/DL — SIGNIFICANT CHANGE UP (ref 0.5–1.3)
GLUCOSE SERPL-MCNC: 102 MG/DL — HIGH (ref 70–99)
HCT VFR BLD CALC: 34.9 % — LOW (ref 39–50)
HGB BLD-MCNC: 10.3 G/DL — LOW (ref 13–17)
INR BLD: 1.02 RATIO — SIGNIFICANT CHANGE UP (ref 0.88–1.16)
MCHC RBC-ENTMCNC: 23.4 PG — LOW (ref 27–34)
MCHC RBC-ENTMCNC: 29.5 GM/DL — LOW (ref 32–36)
MCV RBC AUTO: 79.3 FL — LOW (ref 80–100)
NRBC # BLD: 0 /100 WBCS — SIGNIFICANT CHANGE UP (ref 0–0)
PLATELET # BLD AUTO: 196 K/UL — SIGNIFICANT CHANGE UP (ref 150–400)
POTASSIUM SERPL-MCNC: 3.9 MMOL/L — SIGNIFICANT CHANGE UP (ref 3.5–5.3)
POTASSIUM SERPL-SCNC: 3.9 MMOL/L — SIGNIFICANT CHANGE UP (ref 3.5–5.3)
PROT SERPL-MCNC: 6.7 G/DL — SIGNIFICANT CHANGE UP (ref 6–8.3)
PROTHROM AB SERPL-ACNC: 12.1 SEC — SIGNIFICANT CHANGE UP (ref 10.6–13.6)
RBC # BLD: 4.4 M/UL — SIGNIFICANT CHANGE UP (ref 4.2–5.8)
RBC # FLD: 14.8 % — HIGH (ref 10.3–14.5)
SODIUM SERPL-SCNC: 142 MMOL/L — SIGNIFICANT CHANGE UP (ref 135–145)
WBC # BLD: 4.97 K/UL — SIGNIFICANT CHANGE UP (ref 3.8–10.5)
WBC # FLD AUTO: 4.97 K/UL — SIGNIFICANT CHANGE UP (ref 3.8–10.5)

## 2020-07-27 PROCEDURE — C1785: CPT

## 2020-07-27 PROCEDURE — 85027 COMPLETE CBC AUTOMATED: CPT

## 2020-07-27 PROCEDURE — 85730 THROMBOPLASTIN TIME PARTIAL: CPT

## 2020-07-27 PROCEDURE — 93005 ELECTROCARDIOGRAM TRACING: CPT

## 2020-07-27 PROCEDURE — 33228 REMV&REPLC PM GEN DUAL LEAD: CPT

## 2020-07-27 PROCEDURE — 93010 ELECTROCARDIOGRAM REPORT: CPT

## 2020-07-27 PROCEDURE — 93010 ELECTROCARDIOGRAM REPORT: CPT | Mod: 77

## 2020-07-27 PROCEDURE — 85610 PROTHROMBIN TIME: CPT

## 2020-07-27 PROCEDURE — 80053 COMPREHEN METABOLIC PANEL: CPT

## 2020-07-27 RX ORDER — DORZOLAMIDE HYDROCHLORIDE 20 MG/ML
1 SOLUTION/ DROPS OPHTHALMIC
Qty: 0 | Refills: 0 | DISCHARGE

## 2020-07-27 RX ORDER — PREDNISOLONE 5 MG
1 TABLET ORAL
Qty: 0 | Refills: 0 | DISCHARGE

## 2020-07-27 RX ORDER — ABIRATERONE ACETATE 250 MG/1
4 TABLET ORAL
Qty: 0 | Refills: 0 | DISCHARGE

## 2020-07-27 RX ORDER — CALCITRIOL 0.5 UG/1
1 CAPSULE ORAL
Qty: 0 | Refills: 0 | DISCHARGE

## 2020-07-27 RX ORDER — BIMATOPROST 0.3 MG/ML
1 SOLUTION/ DROPS OPHTHALMIC
Qty: 0 | Refills: 0 | DISCHARGE

## 2020-07-27 RX ORDER — CEPHALEXIN 500 MG
1 CAPSULE ORAL
Qty: 6 | Refills: 0
Start: 2020-07-27 | End: 2020-07-29

## 2020-07-27 RX ORDER — DENOSUMAB 60 MG/ML
1 INJECTION SUBCUTANEOUS
Qty: 0 | Refills: 0 | DISCHARGE

## 2020-07-27 RX ORDER — BRIMONIDINE TARTRATE, TIMOLOL MALEATE 2; 5 MG/ML; MG/ML
1 SOLUTION/ DROPS OPHTHALMIC
Qty: 0 | Refills: 0 | DISCHARGE

## 2020-07-27 RX ORDER — BRIMONIDINE TARTRATE 2 MG/MG
1 SOLUTION/ DROPS OPHTHALMIC
Qty: 0 | Refills: 0 | DISCHARGE

## 2020-07-27 RX ORDER — CICLOPIROX OLAMINE 7.7 MG/G
1 CREAM TOPICAL
Qty: 0 | Refills: 0 | DISCHARGE

## 2020-07-27 RX ORDER — CEPHALEXIN 500 MG
500 CAPSULE ORAL EVERY 12 HOURS
Refills: 0 | Status: DISCONTINUED | OUTPATIENT
Start: 2020-07-27 | End: 2020-08-11

## 2020-07-27 RX ORDER — LOSARTAN/HYDROCHLOROTHIAZIDE 100MG-25MG
1 TABLET ORAL
Qty: 0 | Refills: 0 | DISCHARGE

## 2020-07-27 RX ORDER — APIXABAN 2.5 MG/1
1 TABLET, FILM COATED ORAL
Qty: 0 | Refills: 0 | DISCHARGE

## 2020-07-27 RX ORDER — METOPROLOL TARTRATE 50 MG
1 TABLET ORAL
Qty: 0 | Refills: 0 | DISCHARGE

## 2020-07-27 NOTE — PROGRESS NOTE ADULT - SUBJECTIVE AND OBJECTIVE BOX
EP Brief Operative Note    Diagnosis: complete heart block  Procedure: DC PPM gen change  Surgeon: Armando Fishman M.D.  Findings: none  EBL: minimal  Specimens: none  Post-op Diagnosis: complete heart block  Assistants: none      A/P) s/p successful [employer] dual chamber PPM gen change, no acute complications    -d/c home today  -restart eliqius on wednesday  -keflex 500mg po bid x 3 days  -f/u with me on Thursday Aug 27th at 11am      Armando Fishman M.D., [facility]  Cardiac Electrophysiology  [facility]  [address] [place] [address], [place] [address]  [email or web address]    office [phone]  pager [phone]

## 2020-12-12 ENCOUNTER — TRANSCRIPTION ENCOUNTER (OUTPATIENT)
Age: 85
End: 2020-12-12

## 2021-12-23 NOTE — PATIENT PROFILE ADULT - NSPRONUTRITIONRISK_GEN_A_NUR
Left message on machine for patient to call back.   
No significant findings on US-she can definitely see another ob if she would like-agree with recommendations.   
Patient called stating she was in UC yesterday for stomach pains and had an ultrasound done today. She would like PCP to call her back with the results.  Also, she stated she would like to F/U with PCP about the referral to OB. She would like to discuss the results with her.  Please call patient back.  
Spoke with pt- advised UC will be calling her with results. Pt states that when she saw Dr. Sprague in OBGYN, she did not click with him and she would like a second opinion/ different OBGYN provider. Pt would also like PCP to review OBGYN visit to see if PCP agrees with what OB provider recommended. Please advise  
No indicators present

## 2022-11-14 ENCOUNTER — NON-APPOINTMENT (OUTPATIENT)
Age: 87
End: 2022-11-14

## 2022-12-09 ENCOUNTER — EMERGENCY (EMERGENCY)
Facility: HOSPITAL | Age: 87
LOS: 1 days | Discharge: HOME | End: 2022-12-09
Attending: STUDENT IN AN ORGANIZED HEALTH CARE EDUCATION/TRAINING PROGRAM
Payer: COMMERCIAL

## 2022-12-09 VITALS
WEIGHT: 145.51 LBS | OXYGEN SATURATION: 98 % | HEART RATE: 78 BPM | RESPIRATION RATE: 18 BRPM | SYSTOLIC BLOOD PRESSURE: 165 MMHG | TEMPERATURE: 98 F | DIASTOLIC BLOOD PRESSURE: 99 MMHG

## 2022-12-09 VITALS
DIASTOLIC BLOOD PRESSURE: 62 MMHG | HEART RATE: 71 BPM | RESPIRATION RATE: 18 BRPM | SYSTOLIC BLOOD PRESSURE: 111 MMHG | OXYGEN SATURATION: 100 % | TEMPERATURE: 98 F

## 2022-12-09 DIAGNOSIS — Z98.890 OTHER SPECIFIED POSTPROCEDURAL STATES: Chronic | ICD-10-CM

## 2022-12-09 LAB
ALBUMIN SERPL ELPH-MCNC: 2.8 G/DL — LOW (ref 3.5–5)
ALP SERPL-CCNC: 97 U/L — SIGNIFICANT CHANGE UP (ref 40–120)
ALT FLD-CCNC: 14 U/L DA — SIGNIFICANT CHANGE UP (ref 10–60)
ANION GAP SERPL CALC-SCNC: 7 MMOL/L — SIGNIFICANT CHANGE UP (ref 5–17)
ANISOCYTOSIS BLD QL: SLIGHT — SIGNIFICANT CHANGE UP
AST SERPL-CCNC: 21 U/L — SIGNIFICANT CHANGE UP (ref 10–40)
BASOPHILS # BLD AUTO: 0 K/UL — SIGNIFICANT CHANGE UP (ref 0–0.2)
BASOPHILS NFR BLD AUTO: 0 % — SIGNIFICANT CHANGE UP (ref 0–2)
BILIRUB SERPL-MCNC: 0.3 MG/DL — SIGNIFICANT CHANGE UP (ref 0.2–1.2)
BLD GP AB SCN SERPL QL: SIGNIFICANT CHANGE UP
BUN SERPL-MCNC: 21 MG/DL — HIGH (ref 7–18)
CALCIUM SERPL-MCNC: 10.2 MG/DL — SIGNIFICANT CHANGE UP (ref 8.4–10.5)
CHLORIDE SERPL-SCNC: 109 MMOL/L — HIGH (ref 96–108)
CO2 SERPL-SCNC: 25 MMOL/L — SIGNIFICANT CHANGE UP (ref 22–31)
CREAT SERPL-MCNC: 1.12 MG/DL — SIGNIFICANT CHANGE UP (ref 0.5–1.3)
EGFR: 63 ML/MIN/1.73M2 — SIGNIFICANT CHANGE UP
ELLIPTOCYTES BLD QL SMEAR: SLIGHT — SIGNIFICANT CHANGE UP
EOSINOPHIL # BLD AUTO: 0.06 K/UL — SIGNIFICANT CHANGE UP (ref 0–0.5)
EOSINOPHIL NFR BLD AUTO: 2 % — SIGNIFICANT CHANGE UP (ref 0–6)
FLUAV AG NPH QL: SIGNIFICANT CHANGE UP
FLUBV AG NPH QL: SIGNIFICANT CHANGE UP
GLUCOSE SERPL-MCNC: 111 MG/DL — HIGH (ref 70–99)
HCT VFR BLD CALC: 28.1 % — LOW (ref 39–50)
HGB BLD-MCNC: 8.3 G/DL — LOW (ref 13–17)
HYPOCHROMIA BLD QL: SLIGHT — SIGNIFICANT CHANGE UP
LYMPHOCYTES # BLD AUTO: 0.35 K/UL — LOW (ref 1–3.3)
LYMPHOCYTES # BLD AUTO: 12 % — LOW (ref 13–44)
MANUAL SMEAR VERIFICATION: SIGNIFICANT CHANGE UP
MCHC RBC-ENTMCNC: 22 PG — LOW (ref 27–34)
MCHC RBC-ENTMCNC: 29.5 GM/DL — LOW (ref 32–36)
MCV RBC AUTO: 74.5 FL — LOW (ref 80–100)
MICROCYTES BLD QL: SLIGHT — SIGNIFICANT CHANGE UP
MONOCYTES # BLD AUTO: 0.24 K/UL — SIGNIFICANT CHANGE UP (ref 0–0.9)
MONOCYTES NFR BLD AUTO: 8 % — SIGNIFICANT CHANGE UP (ref 2–14)
MYELOCYTES NFR BLD: 2 % — HIGH (ref 0–0)
NEUTROPHILS # BLD AUTO: 2.24 K/UL — SIGNIFICANT CHANGE UP (ref 1.8–7.4)
NEUTROPHILS NFR BLD AUTO: 76 % — SIGNIFICANT CHANGE UP (ref 43–77)
NRBC # BLD: 0 /100 — SIGNIFICANT CHANGE UP (ref 0–0)
OVALOCYTES BLD QL SMEAR: SLIGHT — SIGNIFICANT CHANGE UP
PLAT MORPH BLD: NORMAL — SIGNIFICANT CHANGE UP
PLATELET # BLD AUTO: 246 K/UL — SIGNIFICANT CHANGE UP (ref 150–400)
PLATELET COUNT - ESTIMATE: NORMAL — SIGNIFICANT CHANGE UP
POIKILOCYTOSIS BLD QL AUTO: SLIGHT — SIGNIFICANT CHANGE UP
POLYCHROMASIA BLD QL SMEAR: SLIGHT — SIGNIFICANT CHANGE UP
POTASSIUM SERPL-MCNC: 4 MMOL/L — SIGNIFICANT CHANGE UP (ref 3.5–5.3)
POTASSIUM SERPL-SCNC: 4 MMOL/L — SIGNIFICANT CHANGE UP (ref 3.5–5.3)
PROT SERPL-MCNC: 7.2 G/DL — SIGNIFICANT CHANGE UP (ref 6–8.3)
RBC # BLD: 3.77 M/UL — LOW (ref 4.2–5.8)
RBC # FLD: 18.5 % — HIGH (ref 10.3–14.5)
RBC BLD AUTO: ABNORMAL
SARS-COV-2 RNA SPEC QL NAA+PROBE: SIGNIFICANT CHANGE UP
SCHISTOCYTES BLD QL AUTO: SLIGHT — SIGNIFICANT CHANGE UP
SODIUM SERPL-SCNC: 141 MMOL/L — SIGNIFICANT CHANGE UP (ref 135–145)
WBC # BLD: 2.95 K/UL — LOW (ref 3.8–10.5)
WBC # FLD AUTO: 2.95 K/UL — LOW (ref 3.8–10.5)

## 2022-12-09 PROCEDURE — 99285 EMERGENCY DEPT VISIT HI MDM: CPT | Mod: 25

## 2022-12-09 PROCEDURE — 80053 COMPREHEN METABOLIC PANEL: CPT

## 2022-12-09 PROCEDURE — 86900 BLOOD TYPING SEROLOGIC ABO: CPT

## 2022-12-09 PROCEDURE — 86923 COMPATIBILITY TEST ELECTRIC: CPT

## 2022-12-09 PROCEDURE — 86901 BLOOD TYPING SEROLOGIC RH(D): CPT

## 2022-12-09 PROCEDURE — 99291 CRITICAL CARE FIRST HOUR: CPT

## 2022-12-09 PROCEDURE — 36430 TRANSFUSION BLD/BLD COMPNT: CPT

## 2022-12-09 PROCEDURE — 87637 SARSCOV2&INF A&B&RSV AMP PRB: CPT

## 2022-12-09 PROCEDURE — P9040: CPT

## 2022-12-09 PROCEDURE — 85025 COMPLETE CBC W/AUTO DIFF WBC: CPT

## 2022-12-09 PROCEDURE — 86850 RBC ANTIBODY SCREEN: CPT

## 2022-12-09 PROCEDURE — 36415 COLL VENOUS BLD VENIPUNCTURE: CPT

## 2022-12-09 NOTE — ED PROVIDER NOTE - CRITICAL CARE ATTENDING CONTRIBUTION TO CARE
I have personally provided the amount of critical care time documented below, excluding time spent on separate procedures - requiring transfusion. Dano Carson, ED Attending

## 2022-12-09 NOTE — ED PROVIDER NOTE - OBJECTIVE STATEMENT
89M HTN, HLD, SSS, AV block s/p PPM, CAD s/p stent, CKD, glaucoma, prostate CA, Afib on Eliquis, And active prostate cancer sent in by patient's hematologist/oncologist for blood transfusion.  Patient denies any melena, bloody stools, nosebleeds.  No chest pain, shortness of breath, lightheadedness.  Patient did recently have COVID about 1 month ago.  Has been having generalized weakness since.  Unsure what hemoglobin was outpatient but was told by his doctor to come to the ER.

## 2022-12-09 NOTE — ED ADULT NURSE NOTE - NSIMPLEMENTINTERV_GEN_ALL_ED
Implemented All Universal Safety Interventions:  [place] to call system. Call bell, personal items and telephone within reach. Instruct patient to call for assistance. Room bathroom lighting operational. Non-slip footwear when patient is off stretcher. Physically safe environment: no spills, clutter or unnecessary equipment. Stretcher in lowest position, wheels locked, appropriate side rails in place. 911 or go to the nearest Emergency Room

## 2022-12-09 NOTE — ED ADULT NURSE NOTE - OBJECTIVE STATEMENT
Patient came to the ED a/o x 3 ambulates sent by PMD for low h/h and blood transfusion. No active bleeding noted.

## 2022-12-09 NOTE — ED PROVIDER NOTE - NSICDXPASTMEDICALHX_GEN_ALL_CORE_FT
PAST MEDICAL HISTORY:  CAD in native artery     Cardiac pacemaker 2010 [employer]    Chronic kidney disease, unspecified CKD stage     Glaucoma     HTN (Hypertension)     Mobitz type 1 second degree AV block s/p PPM implant    PAF (paroxysmal atrial fibrillation) on Eliquis    Prostate Cancer 10 years ago - treated with prostatectomy    Rotator Cuff Injury     SSS (sick sinus syndrome) s/p PPM implant    Stented coronary artery RCA stent in 2012

## 2022-12-09 NOTE — ED PROVIDER NOTE - PATIENT PORTAL LINK FT
You can access the FollowMyHealth Patient Portal offered by [facility] by registering at the following website: [email or web address]. By joining [email or web address]’s FollowMyHealth portal, you will also be able to view your health information using other applications (apps) compatible with our system.

## 2022-12-09 NOTE — ED PROVIDER NOTE - PROGRESS NOTE DETAILS
Spoke to patient's oncologist, states that hemoglobin outpatient was 7.8 which was lower than patient's baseline, would like to try 1 unit of transfusion to see if that helps with patient's generalized weakness.  Patient consented for blood and PRBC ordered agreeable to stay for the transfusion.  Will reassess to dispo.

## 2022-12-09 NOTE — ED PROVIDER NOTE - NSFOLLOWUPINSTRUCTIONS_ED_ALL_ED_FT
You were seen in the emergency department for: blood transfusion  Your results report is attached.   We recommend you follow up with: your primary care doctor.     Please return to the Emergency Department if you experience any of the following symptoms:   - Shortness of breath or trouble breathing  - Pressure, pain or tightness in the chest  - Face drooping, arm weakness or speech difficulty  - Persistence of severe vomiting  - Head injury or loss of consciousness  - Nonstop bleeding or an open wound    (1) Follow up with your primary care physician within the next 24-48 hours as discussed. In addition, we did not find evidence of a life threatening illness on your testing here today, but listed below are the specialists that will be necessary to see as an outpatient to continue the workup.  Please call the numbers listed below or [phone] to set up the necessary appointments.  (2) Take Tylenol (up to 1000mg or 1 g)  and/or Motrin (up to 600mg) up to every 6 hours as needed for pain.   (3) If you had an IV (intravenous) line placed, it was removed. Sometimes, after IV removal, that area can be tender for a few days; if it develops redness and swelling, those could be signs of infection; in which case, return to the Emergency Department for assessment.  (4) Please continue taking all of your home medications as directed.

## 2022-12-09 NOTE — ED PROVIDER NOTE - CLINICAL SUMMARY MEDICAL DECISION MAKING FREE TEXT BOX
89-year-old male with multiple medical problems in the past presenting with anemia, told to come in for transfusion.  On exam vital signs stable, with no focal exam findings.  Symptoms likely secondary to known chronic anemia, as patient is not endorsing any evidence of blood loss.  Will speak to patient's oncologist gather collateral, plan for transfusion, reassess to dispo.

## 2022-12-09 NOTE — ED PROVIDER NOTE - NSICDXPASTSURGICALHX_GEN_ALL_CORE_FT
PAST SURGICAL HISTORY:  Cardiac pacemaker 11/4/10 for symptomatic bradycardia    H/O eye surgery     S/P Prostatectomy 10 years ago    S/P Rotator Cuff Surgery     S/P T&A     S/P TKR (Total Knee Replacement) b/l

## 2022-12-30 ENCOUNTER — EMERGENCY (EMERGENCY)
Facility: HOSPITAL | Age: 87
LOS: 1 days | Discharge: HOME | End: 2022-12-30
Attending: EMERGENCY MEDICINE
Payer: COMMERCIAL

## 2022-12-30 VITALS
SYSTOLIC BLOOD PRESSURE: 125 MMHG | HEIGHT: 73 IN | DIASTOLIC BLOOD PRESSURE: 72 MMHG | RESPIRATION RATE: 16 BRPM | WEIGHT: 145.06 LBS | TEMPERATURE: 97 F | OXYGEN SATURATION: 97 % | HEART RATE: 97 BPM

## 2022-12-30 VITALS
OXYGEN SATURATION: 100 % | TEMPERATURE: 98 F | DIASTOLIC BLOOD PRESSURE: 60 MMHG | RESPIRATION RATE: 18 BRPM | HEART RATE: 74 BPM | SYSTOLIC BLOOD PRESSURE: 102 MMHG

## 2022-12-30 DIAGNOSIS — Z98.890 OTHER SPECIFIED POSTPROCEDURAL STATES: Chronic | ICD-10-CM

## 2022-12-30 LAB
ALBUMIN SERPL ELPH-MCNC: 2.4 G/DL — LOW (ref 3.5–5)
ALP SERPL-CCNC: 100 U/L — SIGNIFICANT CHANGE UP (ref 40–120)
ALT FLD-CCNC: 16 U/L DA — SIGNIFICANT CHANGE UP (ref 10–60)
ANION GAP SERPL CALC-SCNC: 8 MMOL/L — SIGNIFICANT CHANGE UP (ref 5–17)
ANISOCYTOSIS BLD QL: SLIGHT — SIGNIFICANT CHANGE UP
APTT BLD: 28.9 SEC — SIGNIFICANT CHANGE UP (ref 27.5–35.5)
AST SERPL-CCNC: 35 U/L — SIGNIFICANT CHANGE UP (ref 10–40)
BASOPHILS # BLD AUTO: 0 K/UL — SIGNIFICANT CHANGE UP (ref 0–0.2)
BASOPHILS NFR BLD AUTO: 0 % — SIGNIFICANT CHANGE UP (ref 0–2)
BILIRUB SERPL-MCNC: 0.3 MG/DL — SIGNIFICANT CHANGE UP (ref 0.2–1.2)
BLD GP AB SCN SERPL QL: SIGNIFICANT CHANGE UP
BUN SERPL-MCNC: 34 MG/DL — HIGH (ref 7–18)
CALCIUM SERPL-MCNC: 11.4 MG/DL — HIGH (ref 8.4–10.5)
CHLORIDE SERPL-SCNC: 106 MMOL/L — SIGNIFICANT CHANGE UP (ref 96–108)
CO2 SERPL-SCNC: 26 MMOL/L — SIGNIFICANT CHANGE UP (ref 22–31)
CREAT SERPL-MCNC: 1.29 MG/DL — SIGNIFICANT CHANGE UP (ref 0.5–1.3)
EGFR: 53 ML/MIN/1.73M2 — LOW
EOSINOPHIL # BLD AUTO: 0 K/UL — SIGNIFICANT CHANGE UP (ref 0–0.5)
EOSINOPHIL NFR BLD AUTO: 0 % — SIGNIFICANT CHANGE UP (ref 0–6)
GLUCOSE SERPL-MCNC: 127 MG/DL — HIGH (ref 70–99)
HCT VFR BLD CALC: 23.3 % — LOW (ref 39–50)
HGB BLD-MCNC: 7.1 G/DL — LOW (ref 13–17)
INR BLD: 1.2 RATIO — HIGH (ref 0.88–1.16)
LYMPHOCYTES # BLD AUTO: 0.42 K/UL — LOW (ref 1–3.3)
LYMPHOCYTES # BLD AUTO: 9 % — LOW (ref 13–44)
MANUAL SMEAR VERIFICATION: SIGNIFICANT CHANGE UP
MCHC RBC-ENTMCNC: 22.8 PG — LOW (ref 27–34)
MCHC RBC-ENTMCNC: 30.5 GM/DL — LOW (ref 32–36)
MCV RBC AUTO: 74.7 FL — LOW (ref 80–100)
MONOCYTES # BLD AUTO: 0.6 K/UL — SIGNIFICANT CHANGE UP (ref 0–0.9)
MONOCYTES NFR BLD AUTO: 13 % — SIGNIFICANT CHANGE UP (ref 2–14)
NEUTROPHILS # BLD AUTO: 3.58 K/UL — SIGNIFICANT CHANGE UP (ref 1.8–7.4)
NEUTROPHILS NFR BLD AUTO: 77 % — SIGNIFICANT CHANGE UP (ref 43–77)
NRBC # BLD: 0 /100 — SIGNIFICANT CHANGE UP (ref 0–0)
OVALOCYTES BLD QL SMEAR: SLIGHT — SIGNIFICANT CHANGE UP
PLAT MORPH BLD: NORMAL — SIGNIFICANT CHANGE UP
PLATELET # BLD AUTO: 193 K/UL — SIGNIFICANT CHANGE UP (ref 150–400)
PLATELET COUNT - ESTIMATE: NORMAL — SIGNIFICANT CHANGE UP
POIKILOCYTOSIS BLD QL AUTO: SLIGHT — SIGNIFICANT CHANGE UP
POLYCHROMASIA BLD QL SMEAR: SLIGHT — SIGNIFICANT CHANGE UP
POTASSIUM SERPL-MCNC: 4.6 MMOL/L — SIGNIFICANT CHANGE UP (ref 3.5–5.3)
POTASSIUM SERPL-SCNC: 4.6 MMOL/L — SIGNIFICANT CHANGE UP (ref 3.5–5.3)
PROT SERPL-MCNC: 6.7 G/DL — SIGNIFICANT CHANGE UP (ref 6–8.3)
PROTHROM AB SERPL-ACNC: 14.3 SEC — HIGH (ref 10.5–13.4)
RBC # BLD: 3.12 M/UL — LOW (ref 4.2–5.8)
RBC # FLD: 20.1 % — HIGH (ref 10.3–14.5)
RBC BLD AUTO: ABNORMAL
SCHISTOCYTES BLD QL AUTO: SLIGHT — SIGNIFICANT CHANGE UP
SODIUM SERPL-SCNC: 140 MMOL/L — SIGNIFICANT CHANGE UP (ref 135–145)
VARIANT LYMPHS # BLD: 1 % — SIGNIFICANT CHANGE UP (ref 0–6)
WBC # BLD: 4.65 K/UL — SIGNIFICANT CHANGE UP (ref 3.8–10.5)
WBC # FLD AUTO: 4.65 K/UL — SIGNIFICANT CHANGE UP (ref 3.8–10.5)

## 2022-12-30 PROCEDURE — P9040: CPT

## 2022-12-30 PROCEDURE — 85025 COMPLETE CBC W/AUTO DIFF WBC: CPT

## 2022-12-30 PROCEDURE — 85730 THROMBOPLASTIN TIME PARTIAL: CPT

## 2022-12-30 PROCEDURE — 86901 BLOOD TYPING SEROLOGIC RH(D): CPT

## 2022-12-30 PROCEDURE — 99285 EMERGENCY DEPT VISIT HI MDM: CPT | Mod: 25

## 2022-12-30 PROCEDURE — 86900 BLOOD TYPING SEROLOGIC ABO: CPT

## 2022-12-30 PROCEDURE — 36415 COLL VENOUS BLD VENIPUNCTURE: CPT

## 2022-12-30 PROCEDURE — 80053 COMPREHEN METABOLIC PANEL: CPT

## 2022-12-30 PROCEDURE — 85610 PROTHROMBIN TIME: CPT

## 2022-12-30 PROCEDURE — 36430 TRANSFUSION BLD/BLD COMPNT: CPT

## 2022-12-30 PROCEDURE — 86850 RBC ANTIBODY SCREEN: CPT

## 2022-12-30 PROCEDURE — 99284 EMERGENCY DEPT VISIT MOD MDM: CPT

## 2022-12-30 PROCEDURE — 86923 COMPATIBILITY TEST ELECTRIC: CPT

## 2022-12-30 NOTE — ED ADULT NURSE NOTE - DRUG PRE-SCREENING (DAST -1)
Goal Outcome Evaluation:  Plan of Care Reviewed With: patient           Outcome Summary: Pt admitted with CP. No CP throughout noc shift. Pt with stable VS. NPO at MN. Myoview planned for the AM.   Statement Selected

## 2022-12-30 NOTE — ED PROVIDER NOTE - CLINICAL SUMMARY MEDICAL DECISION MAKING FREE TEXT BOX
Patient with history of recurrent transfusion dependent anemia presenting with symptoms of anemia and known hemoglobin to be low as an outpatient.  We will repeat labs transfuse 1 unit PRBCs and likely discharge to follow-up as an outpatient.

## 2022-12-30 NOTE — ED PROVIDER NOTE - OBJECTIVE STATEMENT
89-year-old male with history of recurrent anemia requiring transfusion sent by outpatient physician for low hemoglobin.  Patient is endorsing feeling a little bit of shortness of breath on exertion and overall lower energy level.  He is denying associated chest pains or dyspnea at rest.  He reports this is similar to what happens when he needs a transfusion in the past.  He is denying fevers, chills or other acute symptoms.

## 2022-12-30 NOTE — H&P CARDIOLOGY - VENOUS THROMBOEMBOLISM
[address] [facility]  consulted by Dr. Rain Stephenson for monitoring and adjustment. Indication for treatment: Vancomycin indication: HAP  Goal trough: Trough Goal: 15-20 mcg/mL  AUC/MT: 400-600    Risk Factors for MRSA Identified:   Patient on hemodialysis    Pertinent Laboratory Values:   Temp Readings from Last 3 Encounters:   12/29/22 98 °F (36.7 °C) (Oral)   12/16/22 96.9 °F (36.1 °C) (Oral)   09/24/22 97.2 °F (36.2 °C) (Core)     Recent Labs     12/29/22  1509   WBC 10.3     Recent Labs     12/29/22  1509   BUN 59*   CREATININE 5.0*     Estimated Creatinine Clearance: 18 mL/min (A) (based on SCr of 5 mg/dL (H)). No intake or output data in the 24 hours ending 12/29/22 2053    Pertinent Cultures:   Date    Source    Results  12/29   Blood    pending  12/29   Sputum/Respiratory  pending  12/29   MRSA Nasal   pending      Vancomycin level:   TROUGH:  No results for input(s): VANCOTROUGH in the last 72 hours. RANDOM:  No results for input(s): VANCORANDOM in the last 72 hours. Assessment:  HPI: Resident of skilled nursing facility who presents with increased shortness of breath and increased secretions from chronic trach. SCr, BUN, and urine output: dialysis patient  Day(s) of therapy: Day 1  Vancomycin concentration: pending    Plan:  Vancomycin 2000mg IVPB ordered in ED was not given. Will reorder Vancomycin 2,000mg IVPB x 1 with further dosing per levels. Pharmacy will continue to monitor patient and adjust therapy as indicated    Tana [id number] pending dialysis schedule. Thank you for the consult.   Agustin Castano, [facility]  12/29/2022 8:53 PM no

## 2022-12-30 NOTE — ED ADULT NURSE REASSESSMENT NOTE - NS ED NURSE REASSESS COMMENT FT1
Pt completed blood transfusion, no reaction/adverse effects noted. Pt cleared for discharge, educated pt on results, follow-up appointments, worsening symptoms and medication. Pt and family verbalized understanding of all teaching

## 2022-12-30 NOTE — ED PROVIDER NOTE - NSFOLLOWUPINSTRUCTIONS_ED_ALL_ED_FT
Thank you for choosing [facility] for your health care.    You were seen for anemia.  You have chronic anemia and required transfusions regularly.  We gave you 1 unit of blood here in the emergency department based off of your blood counts today.  Please follow-up with your primary care doctor for further care.  Please return to the emergency department for severe chest pains, severe difficulty breathing or for any other concerning symptoms.

## 2022-12-30 NOTE — ED PROVIDER NOTE - PATIENT PORTAL LINK FT
You can access the FollowMyHealth Patient Portal offered by [facility] by registering at the following website: [email or web address]. By joining [employer]’s FollowMyHealth portal, you will also be able to view your health information using other applications (apps) compatible with our system.

## 2023-01-25 ENCOUNTER — EMERGENCY (EMERGENCY)
Facility: HOSPITAL | Age: 88
LOS: 1 days | Discharge: HOME | End: 2023-01-25
Attending: EMERGENCY MEDICINE
Payer: COMMERCIAL

## 2023-01-25 VITALS
DIASTOLIC BLOOD PRESSURE: 76 MMHG | WEIGHT: 138.89 LBS | HEART RATE: 73 BPM | RESPIRATION RATE: 19 BRPM | TEMPERATURE: 98 F | SYSTOLIC BLOOD PRESSURE: 165 MMHG | HEIGHT: 73 IN | OXYGEN SATURATION: 100 %

## 2023-01-25 VITALS
HEART RATE: 65 BPM | RESPIRATION RATE: 18 BRPM | TEMPERATURE: 98 F | SYSTOLIC BLOOD PRESSURE: 114 MMHG | DIASTOLIC BLOOD PRESSURE: 60 MMHG | OXYGEN SATURATION: 99 %

## 2023-01-25 DIAGNOSIS — Z98.890 OTHER SPECIFIED POSTPROCEDURAL STATES: Chronic | ICD-10-CM

## 2023-01-25 LAB
ALBUMIN SERPL ELPH-MCNC: 2.6 G/DL — LOW (ref 3.5–5)
ALBUMIN SERPL ELPH-MCNC: 2.9 G/DL — LOW (ref 3.5–5)
ALP SERPL-CCNC: 108 U/L — SIGNIFICANT CHANGE UP (ref 40–120)
ALP SERPL-CCNC: 97 U/L — SIGNIFICANT CHANGE UP (ref 40–120)
ALT FLD-CCNC: 13 U/L DA — SIGNIFICANT CHANGE UP (ref 10–60)
ALT FLD-CCNC: 16 U/L DA — SIGNIFICANT CHANGE UP (ref 10–60)
ANION GAP SERPL CALC-SCNC: 8 MMOL/L — SIGNIFICANT CHANGE UP (ref 5–17)
ANION GAP SERPL CALC-SCNC: 9 MMOL/L — SIGNIFICANT CHANGE UP (ref 5–17)
APTT BLD: 29.3 SEC — SIGNIFICANT CHANGE UP (ref 27.5–35.5)
AST SERPL-CCNC: 20 U/L — SIGNIFICANT CHANGE UP (ref 10–40)
AST SERPL-CCNC: 21 U/L — SIGNIFICANT CHANGE UP (ref 10–40)
BASOPHILS # BLD AUTO: 0.02 K/UL — SIGNIFICANT CHANGE UP (ref 0–0.2)
BASOPHILS NFR BLD AUTO: 0.7 % — SIGNIFICANT CHANGE UP (ref 0–2)
BILIRUB SERPL-MCNC: 0.3 MG/DL — SIGNIFICANT CHANGE UP (ref 0.2–1.2)
BILIRUB SERPL-MCNC: 0.4 MG/DL — SIGNIFICANT CHANGE UP (ref 0.2–1.2)
BLD GP AB SCN SERPL QL: SIGNIFICANT CHANGE UP
BUN SERPL-MCNC: 32 MG/DL — HIGH (ref 7–18)
BUN SERPL-MCNC: 33 MG/DL — HIGH (ref 7–18)
CALCIUM SERPL-MCNC: 12.2 MG/DL — HIGH (ref 8.4–10.5)
CALCIUM SERPL-MCNC: 13 MG/DL — CRITICAL HIGH (ref 8.4–10.5)
CHLORIDE SERPL-SCNC: 109 MMOL/L — HIGH (ref 96–108)
CHLORIDE SERPL-SCNC: 111 MMOL/L — HIGH (ref 96–108)
CO2 SERPL-SCNC: 25 MMOL/L — SIGNIFICANT CHANGE UP (ref 22–31)
CO2 SERPL-SCNC: 27 MMOL/L — SIGNIFICANT CHANGE UP (ref 22–31)
CREAT SERPL-MCNC: 1.18 MG/DL — SIGNIFICANT CHANGE UP (ref 0.5–1.3)
CREAT SERPL-MCNC: 1.39 MG/DL — HIGH (ref 0.5–1.3)
EGFR: 48 ML/MIN/1.73M2 — LOW
EGFR: 59 ML/MIN/1.73M2 — LOW
EOSINOPHIL # BLD AUTO: 0.03 K/UL — SIGNIFICANT CHANGE UP (ref 0–0.5)
EOSINOPHIL NFR BLD AUTO: 1 % — SIGNIFICANT CHANGE UP (ref 0–6)
GLUCOSE SERPL-MCNC: 91 MG/DL — SIGNIFICANT CHANGE UP (ref 70–99)
GLUCOSE SERPL-MCNC: 95 MG/DL — SIGNIFICANT CHANGE UP (ref 70–99)
HCT VFR BLD CALC: 26.5 % — LOW (ref 39–50)
HGB BLD-MCNC: 8.2 G/DL — LOW (ref 13–17)
IMM GRANULOCYTES NFR BLD AUTO: 1.3 % — HIGH (ref 0–0.9)
INR BLD: 1.1 RATIO — SIGNIFICANT CHANGE UP (ref 0.88–1.16)
LYMPHOCYTES # BLD AUTO: 0.67 K/UL — LOW (ref 1–3.3)
LYMPHOCYTES # BLD AUTO: 22.5 % — SIGNIFICANT CHANGE UP (ref 13–44)
MCHC RBC-ENTMCNC: 23.9 PG — LOW (ref 27–34)
MCHC RBC-ENTMCNC: 30.9 GM/DL — LOW (ref 32–36)
MCV RBC AUTO: 77.3 FL — LOW (ref 80–100)
MONOCYTES # BLD AUTO: 0.31 K/UL — SIGNIFICANT CHANGE UP (ref 0–0.9)
MONOCYTES NFR BLD AUTO: 10.4 % — SIGNIFICANT CHANGE UP (ref 2–14)
NEUTROPHILS # BLD AUTO: 1.91 K/UL — SIGNIFICANT CHANGE UP (ref 1.8–7.4)
NEUTROPHILS NFR BLD AUTO: 64.1 % — SIGNIFICANT CHANGE UP (ref 43–77)
NRBC # BLD: 0 /100 WBCS — SIGNIFICANT CHANGE UP (ref 0–0)
PLATELET # BLD AUTO: 91 K/UL — LOW (ref 150–400)
POTASSIUM SERPL-MCNC: 3.8 MMOL/L — SIGNIFICANT CHANGE UP (ref 3.5–5.3)
POTASSIUM SERPL-MCNC: 4 MMOL/L — SIGNIFICANT CHANGE UP (ref 3.5–5.3)
POTASSIUM SERPL-SCNC: 3.8 MMOL/L — SIGNIFICANT CHANGE UP (ref 3.5–5.3)
POTASSIUM SERPL-SCNC: 4 MMOL/L — SIGNIFICANT CHANGE UP (ref 3.5–5.3)
PROT SERPL-MCNC: 5.9 G/DL — LOW (ref 6–8.3)
PROT SERPL-MCNC: 6.8 G/DL — SIGNIFICANT CHANGE UP (ref 6–8.3)
PROTHROM AB SERPL-ACNC: 13.1 SEC — SIGNIFICANT CHANGE UP (ref 10.5–13.4)
RBC # BLD: 3.43 M/UL — LOW (ref 4.2–5.8)
RBC # FLD: 20.8 % — HIGH (ref 10.3–14.5)
SODIUM SERPL-SCNC: 144 MMOL/L — SIGNIFICANT CHANGE UP (ref 135–145)
SODIUM SERPL-SCNC: 145 MMOL/L — SIGNIFICANT CHANGE UP (ref 135–145)
WBC # BLD: 2.98 K/UL — LOW (ref 3.8–10.5)
WBC # FLD AUTO: 2.98 K/UL — LOW (ref 3.8–10.5)

## 2023-01-25 PROCEDURE — 93005 ELECTROCARDIOGRAM TRACING: CPT

## 2023-01-25 PROCEDURE — 36415 COLL VENOUS BLD VENIPUNCTURE: CPT

## 2023-01-25 PROCEDURE — 86901 BLOOD TYPING SEROLOGIC RH(D): CPT

## 2023-01-25 PROCEDURE — 86850 RBC ANTIBODY SCREEN: CPT

## 2023-01-25 PROCEDURE — 80053 COMPREHEN METABOLIC PANEL: CPT

## 2023-01-25 PROCEDURE — 86900 BLOOD TYPING SEROLOGIC ABO: CPT

## 2023-01-25 PROCEDURE — 99283 EMERGENCY DEPT VISIT LOW MDM: CPT

## 2023-01-25 PROCEDURE — 85610 PROTHROMBIN TIME: CPT

## 2023-01-25 PROCEDURE — 99285 EMERGENCY DEPT VISIT HI MDM: CPT

## 2023-01-25 PROCEDURE — 85730 THROMBOPLASTIN TIME PARTIAL: CPT

## 2023-01-25 PROCEDURE — 85025 COMPLETE CBC W/AUTO DIFF WBC: CPT

## 2023-01-25 RX ORDER — SODIUM CHLORIDE 9 MG/ML
1000 INJECTION INTRAMUSCULAR; INTRAVENOUS; SUBCUTANEOUS ONCE
Refills: 0 | Status: COMPLETED | OUTPATIENT
Start: 2023-01-25 | End: 2023-01-25

## 2023-01-25 RX ADMIN — SODIUM CHLORIDE 1000 MILLILITER(S): 9 INJECTION INTRAMUSCULAR; INTRAVENOUS; SUBCUTANEOUS at 11:01

## 2023-01-25 NOTE — ED ADULT NURSE NOTE - CAS ELECT INFOMATION PROVIDED
DC instructions Sarecycline Counseling: Patient advised regarding possible photosensitivity and discoloration of the teeth, skin, lips, tongue and gums.  Patient instructed to avoid sunlight, if possible.  When exposed to sunlight, patients should wear protective clothing, sunglasses, and sunscreen.  The patient was instructed to call the office immediately if the following severe adverse effects occur:  hearing changes, easy bruising/bleeding, severe headache, or vision changes.  The patient verbalized understanding of the proper use and possible adverse effects of sarecycline.  All of the patient's questions and concerns were addressed.

## 2023-01-25 NOTE — ED PROVIDER NOTE - CLINICAL SUMMARY MEDICAL DECISION MAKING FREE TEXT BOX
Patient with history of recurrent transfusion dependent anemia presenting with symptoms of his typical anemia.  Will obtain repeat labs in the emergency department and anticipate transfusing 1 unit of PRBCs.

## 2023-01-25 NOTE — ED PROVIDER NOTE - PATIENT PORTAL LINK FT
You can access the FollowMyHealth Patient Portal offered by [facility] by registering at the following website: [email or web address]. By joining [employer]’s FollowMyHealth portal, you will also be able to view your health information using other applications (apps) compatible with our system.
Improved

## 2023-01-25 NOTE — ED PROVIDER NOTE - OBJECTIVE STATEMENT
89-year-old man with a history of recurrent anemia requiring frequent transfusions presenting to the emergency department for symptoms of anemia with an outpatient hemoglobin of 7.6.  He is endorsing feeling a little lightheaded and some shortness of breath with exertion.  He is denying chest pains.

## 2023-01-25 NOTE — ED PROVIDER NOTE - PROGRESS NOTE DETAILS
Patient's labs did have anemia but not to a level that would warrant transfusion.  He was found to be hypercalcemic which improved with IV fluids along with a mild HOMAR.  Discussed results with patient he is comfortable going home to follow-up with his doctor in the next 2 days for repeat blood work.  Instructed him to avoid any calcium supplements for the next few days pending repeat blood work and to maintain hydration status which they understood.  I considered admission for this patient for his HOMAR and hypercalcemia but ultimately as his symptoms were minimal and his number was able to start being corrected with IV fluids I decided it was not warranted in this case.

## 2023-01-25 NOTE — ED PROVIDER NOTE - NSFOLLOWUPINSTRUCTIONS_ED_ALL_ED_FT
Thank you for choosing [facility] for your health care.    You are seen in the ER for abnormal blood work.  Your hemoglobin level while low was not low enough to need transfusion and was improved from your doctors office.  You were found to have an elevated calcium level here which was treated with IV fluids.  We recommend that you do not take any calcium supplements or food fortified with calcium over the next few days until you get the levels rechecked in your primary care doctor's office.  Please follow-up with them within the next few days to obtain this further testing.  You should also drink plenty of water at home to prevent dehydration.  Please return to the emergency department if you are feeling unwell, you are becoming confused, you are having sudden worsening in your ability to walk or maintain balance or for any other concerning symptoms.

## 2023-01-25 NOTE — ED PROVIDER NOTE - PHYSICAL EXAMINATION
Exam:  General: Patient well appearing, vital signs within normal limits  HEENT: airway patent with moist mucous membranes  Cardiac: RRR S1/S2 with strong peripheral pulses  Respiratory: lungs clear without respiratory distress  Neuro: no gross neurologic deficits  Skin: warm, well perfused  Psych: normal mood and affect

## 2024-05-01 NOTE — ED ADULT NURSE NOTE - CCCP TRG CHIEF CMPLNT
[5] : 5 [4] : 4 send for blood transfusion [Dull/Aching] : dull/aching [Nothing helps with pain getting better] : Nothing helps with pain getting better [] : no [FreeTextEntry1] : left shoulder [FreeTextEntry5] : pain for 2 months no injury no n/t went to [facility] er 2 weeks ago-meds [de-identified] : activity [de-identified] : L shoulder 2019